# Patient Record
Sex: FEMALE | Race: WHITE | Employment: FULL TIME | URBAN - METROPOLITAN AREA
[De-identification: names, ages, dates, MRNs, and addresses within clinical notes are randomized per-mention and may not be internally consistent; named-entity substitution may affect disease eponyms.]

---

## 2017-03-02 ENCOUNTER — TRANSFERRED RECORDS (OUTPATIENT)
Dept: HEALTH INFORMATION MANAGEMENT | Facility: CLINIC | Age: 57
End: 2017-03-02

## 2017-03-06 DIAGNOSIS — I10 UNCONTROLLED HYPERTENSION: Primary | ICD-10-CM

## 2018-03-19 ENCOUNTER — TRANSFERRED RECORDS (OUTPATIENT)
Dept: HEALTH INFORMATION MANAGEMENT | Facility: CLINIC | Age: 58
End: 2018-03-19

## 2018-03-19 DIAGNOSIS — I10 BENIGN ESSENTIAL HYPERTENSION: Primary | ICD-10-CM

## 2018-04-05 ENCOUNTER — TRANSFERRED RECORDS (OUTPATIENT)
Dept: HEALTH INFORMATION MANAGEMENT | Facility: CLINIC | Age: 58
End: 2018-04-05

## 2018-05-08 ENCOUNTER — OFFICE VISIT (OUTPATIENT)
Dept: CARDIOLOGY | Facility: CLINIC | Age: 58
End: 2018-05-08
Payer: COMMERCIAL

## 2018-05-08 VITALS
HEIGHT: 64 IN | OXYGEN SATURATION: 98 % | SYSTOLIC BLOOD PRESSURE: 224 MMHG | HEART RATE: 50 BPM | WEIGHT: 194 LBS | BODY MASS INDEX: 33.12 KG/M2 | DIASTOLIC BLOOD PRESSURE: 102 MMHG

## 2018-05-08 DIAGNOSIS — I10 BENIGN ESSENTIAL HYPERTENSION: Primary | ICD-10-CM

## 2018-05-08 PROCEDURE — 99204 OFFICE O/P NEW MOD 45 MIN: CPT | Performed by: INTERNAL MEDICINE

## 2018-05-08 RX ORDER — CARVEDILOL 25 MG/1
25 TABLET ORAL 2 TIMES DAILY WITH MEALS
Qty: 60 TABLET | Refills: 11 | Status: SHIPPED | OUTPATIENT
Start: 2018-05-08 | End: 2018-10-15

## 2018-05-08 RX ORDER — BUPROPION HYDROCHLORIDE 150 MG/1
150 TABLET, FILM COATED, EXTENDED RELEASE ORAL 2 TIMES DAILY
COMMUNITY
End: 2018-10-15

## 2018-05-08 RX ORDER — AMLODIPINE BESYLATE 5 MG/1
5 TABLET ORAL DAILY
Qty: 30 TABLET | Refills: 11 | Status: SHIPPED | OUTPATIENT
Start: 2018-05-08

## 2018-05-08 NOTE — LETTER
5/8/2018    Clarisa Cancino NP  [facility] [address]  [facility] [address]    RE: Lorena Gutierrez       Dear Colleague,    I had the pleasure of seeing Lorena Gutierrez in the [facility].    HISTORY:    Lorena Gutierrez is a pleasant 58-year-old female who was asked to see me for assistance in management of hypertension.  She has a history of diabetes, hyperlipidemia, and a history of smoking.    Lorena has never had any cardiac issues.  She denies exertional chest, arm, neck, or jaw discomfort as well as any sense of palpitations, PND/orthopnea, syncope/near syncope, strokelike symptoms, orthostasis, peripheral edema, or claudication.  She admits to being anxious when she goes into see physicians but she does not complain of jitteriness and shakiness or flushing.    Cardiac risk factors include a history of severe and poorly controlled hypertension, hyperlipidemia for which she is on Lipitor 40 mg per day, type 2 diabetes dating back at least 10 years, and ongoing cigarette use at a rate of 0.5-1 pack per day.  She also has a strong family history of coronary artery disease with her mother dying of myocardial infarct at age 56, father dying at age 70 of myocardial infarct.  There is also a very strong family history of hypertension in both her parents and in all of her siblings that she still keeps in touch with.  Patient herself states that she has treated for hypertension for at least 15 years.  It has recently been higher than usual with home blood pressures reading about 180/110.    The patient is aware of nonpharmacologic mechanisms for blood pressure control.  She is an ex-drinker but no longer uses alcohol.  She tries to minimize her salt intake not only by avoiding added salt but also watching salt content of her foods.  It sounds like she avoids typical high salt symptoms.  She also states that she walks daily about 4 miles.  She does not find her self short of  breath with this activity nor does she develop chest pain.    Lorena reports that she usually sleeps okay although she admits that she is often tired in the daytime.  She feels refreshed when she first gets up.  Her  has never noticed that she stops breathing at night and he has never complained about her being a heavy snorer.    The chart is reviewed and it looks like there is no evidence of secondary causes of hypertension.  She had renal ultrasounds done showing no evidence of renal stenosis back in October 2016.  Laboratory studies including TSH, comprehensive metabolic profile, CBC, urinalysis, and magnesium are all within normal limits.      ASSESSMENT/PLAN:    1.  Severe essential hypertension, uncontrolled.  No secondary causes apparent.  I will have her do overnight oximetry to make sure were not missing sleep apnea although she does not give symptoms suggesting this.  She seems to be following generally healthy lifestyle with reduced salt intake, regular exercise, and avoidance of alcohol.  She denies over-the-counter or recreational drug use.  Blood pressure management will simply be continued trial and error.  Today I have switched her off of metoprolol and on to carvedilol at the equivalent dose (25 mg twice daily) since it tends to be a little bit better at blood pressure control.  I have also added Norvasc at a dose of 5 mg per day.  I have asked her to check her blood pressure and keep a list for me, varying the time of day that it is measured.  I will plan on seeing her in another month and we will review that list.  She will bring her blood pressure machine and to make sure it is calibrated correctly.  Our next pharmacologic change will be to add a diuretic if blood pressure is not well-controlled.    Thank you for asking me to participate in your patient's care.  Please do not hesitate to call if I can be of further assistance.    Orders Placed This Encounter   Procedures     Follow-Up with  Cardiologist     Overnight oximetry study     Exercise Stress Echocardiogram     Orders Placed This Encounter   Medications     buPROPion (ZYBAN) 150 MG 12 hr tablet     Sig: Take 150 mg by mouth 2 times daily     CLONIDINE HCL PO     Sig: Take 0.3 mg by mouth 2 times daily     dapagliflozin (FARXIGA) tablet     Sig: Take 5 mg by mouth daily     carvedilol (COREG) 25 MG tablet     Sig: Take 1 tablet (25 mg) by mouth 2 times daily (with meals)     Dispense:  60 tablet     Refill:  11     amLODIPine (NORVASC) 5 MG tablet     Sig: Take 1 tablet (5 mg) by mouth daily     Dispense:  30 tablet     Refill:  11     Medications Discontinued During This Encounter   Medication Reason     budesonide-formoterol (SYMBICORT) 160-4.5 MCG/ACT inhaler Therapy completed     hydrochlorothiazide (HYDRODIURIL) 25 MG tablet Therapy completed     metFORMIN (GLUCOPHAGE-XR) 500 MG 24 hr tablet Therapy completed     METOPROLOL TARTRATE PO Medication Reconciliation Clean Up     metoprolol (LOPRESSOR) 100 MG tablet Alternate therapy         Encounter Diagnosis   Name Primary?     Benign essential hypertension Yes       CURRENT MEDICATIONS:  Current Outpatient Prescriptions   Medication Sig Dispense Refill     albuterol (VENTOLIN HFA) 108 (90 BASE) MCG/ACT inhaler Inhale 2 puffs into the lungs every 6 hours.       ALLOPURINOL PO Take 300 mg by mouth daily       amLODIPine (NORVASC) 5 MG tablet Take 1 tablet (5 mg) by mouth daily 30 tablet 11     ASPIRIN ADULT LOW STRENGTH PO Take 81 mg by mouth daily       ATORVASTATIN CALCIUM PO Take 40 mg by mouth       carvedilol (COREG) 25 MG tablet Take 1 tablet (25 mg) by mouth 2 times daily (with meals) 60 tablet 11     CLONIDINE HCL PO Take 0.3 mg by mouth 2 times daily       dapagliflozin (FARXIGA) tablet Take 5 mg by mouth daily       fenofibric acid (TRILIPIX) 135 MG capsule Take 135 mg by mouth daily       lisinopril (PRINIVIL,ZESTRIL) 20 MG tablet Take 40 mg by mouth daily        Blood Glucose  "Monitoring Suppl ([address] CONTOUR MONITOR) W/DEVICE KIT        buPROPion (ZYBAN) 150 MG 12 hr tablet Take 150 mg by mouth 2 times daily       glucose blood VI test strips (LYNDSEY CONTOUR TEST) strip by In Vitro route daily.       Lancets (MICROLET) MISC        order for DME Equipment being ordered: Blood pressure monitor/cuff 1 Device 0       ALLERGIES   No Known Allergies    PAST MEDICAL HISTORY:  Past Medical History:   Diagnosis Date     COPD (chronic obstructive pulmonary disease) (H)      Diabetes mellitus (H)      Hypertension        PAST SURGICAL HISTORY:  No past surgical history on file.    FAMILY HISTORY:  No family history on file.    SOCIAL HISTORY:  Social History     Social History     Marital status:      Spouse name: N/A     Number of children: N/A     Years of education: N/A     Social History Main Topics     Smoking status: Current Every Day Smoker     Packs/day: 1.00     Types: Cigarettes     Smokeless tobacco: Not on file     Alcohol use No     Drug use: No     Sexual activity: Not on file     Other Topics Concern     Not on file     Social History Narrative       Review of Systems:  Skin:  Negative     Eyes:  Negative    ENT:  Negative    Respiratory:  Negative    Cardiovascular:  Negative for;palpitations;chest pain;edema lightheadedness;dizziness;Positive for  Gastroenterology: Negative    Genitourinary:  Negative    Musculoskeletal:  Negative    Neurologic:  Negative    Psychiatric:  Negative    Heme/Lymph/Imm:  Negative    Endocrine:  Negative      Physical Exam:  Vitals: BP (!) 224/102  Pulse 50  Ht 1.626 m (5' 4\")  Wt 88 kg (194 lb)  SpO2 98%  BMI 33.3 kg/m2    Constitutional:  cooperative appears older than stated age      Skin:  warm and dry to the touch        Head:  normocephalic        Eyes:  no xanthalasma        ENT:  no pallor or cyanosis        Neck:  carotid pulses are full and equal bilaterally, JVP normal, no carotid bruit        Chest:      Diffuse rhonchi, no " wheezes, breath sounds mildly diminished throughout.    Cardiac: regular rhythm, normal S1/S2, no S3 or S4, apical impulse not displaced, no murmurs, gallops or rubs                  Abdomen:  abdomen soft;BS normoactive        Vascular: pulses full and equal                                      Extremities and Back:  no edema        Neurological:  no gross motor deficits          Recent Lab Results:  LIPID RESULTS:  No results found for: CHOL, HDL, LDL, TRIG, CHOLHDLRATIO    LIVER ENZYME RESULTS:  Lab Results   Component Value Date    AST 20 09/30/2016    ALT 32 09/30/2016       CBC RESULTS:  Lab Results   Component Value Date    WBC 10.1 09/30/2016    RBC 5.54 (H) 09/30/2016    HGB 16.1 (H) 09/30/2016    HCT 47.7 (H) 09/30/2016    MCV 86 09/30/2016    MCH 29.1 09/30/2016    MCHC 33.8 09/30/2016    RDW 14.4 09/30/2016     09/30/2016       BMP RESULTS:  Lab Results   Component Value Date     09/30/2016    POTASSIUM 3.7 09/30/2016    CHLORIDE 105 09/30/2016    CO2 31 09/30/2016    ANIONGAP 6 09/30/2016     (H) 09/30/2016    BUN 16 09/30/2016    CR 0.80 09/30/2016    GFRESTIMATED 74 09/30/2016    GFRESTBLACK 90 09/30/2016    CLAUDIO 9.5 09/30/2016        A1C RESULTS:  No results found for: A1C    INR RESULTS:  No results found for: INR      Thank you for allowing me to participate in the care of your patient.    Sincerely,     Phuc Quezada MD     [facility]

## 2018-05-08 NOTE — MR AVS SNAPSHOT
"              After Visit Summary   5/8/2018    Lorena Gutierrez    MRN: [id number]           Patient Information     Date Of Birth          1960        Visit Information        Provider Department      5/8/2018 2:00 PM Phuc Quezada MD [facility]        Today's Diagnoses     Benign essential hypertension    -  1       Follow-ups after your visit        Additional Services     Follow-Up with Cardiologist                 Future tests that were ordered for you today     Open Future Orders        Priority Expected Expires Ordered    Follow-Up with Cardiologist Routine 6/7/2018 5/8/2019 5/8/2018    Exercise Stress Echocardiogram Routine 5/15/2018 5/8/2019 5/8/2018    Overnight oximetry study Routine 5/15/2018 5/8/2019 5/8/2018            Who to contact     If you have questions or need follow up information about today's clinic visit or your schedule please contact [facility] directly at [phone].  Normal or non-critical lab and imaging results will be communicated to you by [employer], letter or phone within 4 business days after the clinic has received the results. If you do not hear from us within 7 days, please contact the clinic through [employer] or phone. If you have a critical or abnormal lab result, we will notify you by phone as soon as possible.  Submit refill requests through [employer] or call your pharmacy and they will forward the refill request to us. Please allow 3 business days for your refill to be completed.          Additional Information About Your Visit        Wallarmhart Information     [employer] lets you send messages to your doctor, view your test results, renew your prescriptions, schedule appointments and more. To sign up, go to [email or web address] . Click on \"Log in\" on the left side of the screen, which will take you to the Welcome page. Then click on \"Sign up Now\" on the right side of the page. " "    You will be asked to enter the access code listed below, as well as some personal information. Please follow the directions to create your username and password.     Your access code is: [email or web address]  Expires: 2018  2:13 PM     Your access code will  in 90 days. If you need help or a new code, please call your [facility] clinic or [phone].        Care EveryWhere ID     This is your Care EveryWhere ID. This could be used by other organizations to access your [facility] medical records  [id number]        Your Vitals Were     Pulse Height Pulse Oximetry BMI (Body Mass Index)          50 1.626 m (5' 4\") 98% 33.3 kg/m2         Blood Pressure from Last 3 Encounters:   18 (!) 224/102   16 179/82   07/15/13 (!) 163/93    Weight from Last 3 Encounters:   18 88 kg (194 lb)   07/15/13 99.8 kg (220 lb)                 Today's Medication Changes          These changes are accurate as of 18  2:38 PM.  If you have any questions, ask your nurse or doctor.               Start taking these medicines.        Dose/Directions    amLODIPine 5 MG tablet   Commonly known as:  NORVASC   Used for:  Benign essential hypertension        Dose:  5 mg   Take 1 tablet (5 mg) by mouth daily   Quantity:  30 tablet   Refills:  11       carvedilol 25 MG tablet   Commonly known as:  COREG   Used for:  Benign essential hypertension        Dose:  25 mg   Take 1 tablet (25 mg) by mouth 2 times daily (with meals)   Quantity:  60 tablet   Refills:  11         Stop taking these medicines if you haven't already. Please contact your care team if you have questions.     metoprolol tartrate 100 MG tablet   Commonly known as:  LOPRESSOR                Where to get your medicines      These medications were sent to [facility] - [address]   [address] [address]     Phone:  [phone]     amLODIPine 5 MG tablet    carvedilol 25 MG tablet                Primary Care Provider Office Phone # Fax # "    Clarisa Cancino, KIRILL [phone]       [place] [address]   [facility] [address]        Equal Access to Services     TOMMYJAIME SHERRI : Marysol haresh sierra paramjito Soilianaali, waaxda luqadaha, qaybta kaalmada adegilada, hermes barroso maralranjit tyler reyna garcia. So [facility] [phone].    ATENCIÓN: Si habla español, tiene a avelar disposición servicios gratuitos de asistencia lingüística. Isaiah al [phone].    We comply with applicable federal civil rights laws and [place] laws. We do not discriminate on the basis of race, color, national origin, age, disability, sex, sexual orientation, or gender identity.            Thank you!     Thank you for choosing [facility]  for your care. Our goal is always to provide you with excellent care. Hearing back from our patients is one way we can continue to improve our services. Please take a few minutes to complete the written survey that you may receive in the mail after your visit with us. Thank you!             Your Updated Medication List - Protect others around you: Learn how to safely use, store and throw away your medicines at www.disposemymeds.org.          This list is accurate as of 5/8/18  2:38 PM.  Always use your most recent med list.                   Brand Name Dispense Instructions for use Diagnosis    ALLOPURINOL PO      Take 300 mg by mouth daily        amLODIPine 5 MG tablet    NORVASC    30 tablet    Take 1 tablet (5 mg) by mouth daily    Benign essential hypertension       ASPIRIN ADULT LOW STRENGTH PO      Take 81 mg by mouth daily        ATORVASTATIN CALCIUM PO      Take 40 mg by mouth        LYNDSEY CONTOUR test strip   Generic drug:  blood glucose monitoring      by In Vitro route daily.        blood glucose monitoring lancets           blood glucose monitoring meter device kit           buPROPion 150 MG 12 hr tablet    ZYBAN     Take 150 mg by mouth 2 times daily        carvedilol 25 MG  tablet    COREG    60 tablet    Take 1 tablet (25 mg) by mouth 2 times daily (with meals)    Benign essential hypertension       CLONIDINE HCL PO      Take 0.3 mg by mouth 2 times daily        dapagliflozin tablet    FARXIGA     Take 5 mg by mouth daily        lisinopril 20 MG tablet    PRINIVIL/ZESTRIL     Take 40 mg by mouth daily        order for DME     1 Device    Equipment being ordered: Blood pressure monitor/cuff    Hypertensive urgency       TRILIPIX 135 MG capsule   Generic drug:  fenofibric acid      Take 135 mg by mouth daily        VENTOLIN  (90 Base) MCG/ACT Inhaler   Generic drug:  albuterol      Inhale 2 puffs into the lungs every 6 hours.

## 2018-05-08 NOTE — PROGRESS NOTES
HISTORY:    Lorena Gutierrez is a pleasant 58-year-old female who was asked to see me for assistance in management of hypertension.  She has a history of diabetes, hyperlipidemia, and a history of smoking.    Lorena has never had any cardiac issues.  She denies exertional chest, arm, neck, or jaw discomfort as well as any sense of palpitations, PND/orthopnea, syncope/near syncope, strokelike symptoms, orthostasis, peripheral edema, or claudication.  She admits to being anxious when she goes into see physicians but she does not complain of jitteriness and shakiness or flushing.    Cardiac risk factors include a history of severe and poorly controlled hypertension, hyperlipidemia for which she is on Lipitor 40 mg per day, type 2 diabetes dating back at least 10 years, and ongoing cigarette use at a rate of 0.5-1 pack per day.  She also has a strong family history of coronary artery disease with her mother dying of myocardial infarct at age 56, father dying at age 70 of myocardial infarct.  There is also a very strong family history of hypertension in both her parents and in all of her siblings that she still keeps in touch with.  Patient herself states that she has treated for hypertension for at least 15 years.  It has recently been higher than usual with home blood pressures reading about 180/110.    The patient is aware of nonpharmacologic mechanisms for blood pressure control.  She is an ex-drinker but no longer uses alcohol.  She tries to minimize her salt intake not only by avoiding added salt but also watching salt content of her foods.  It sounds like she avoids typical high salt symptoms.  She also states that she walks daily about 4 miles.  She does not find her self short of breath with this activity nor does she develop chest pain.    Lorena reports that she usually sleeps okay although she admits that she is often tired in the daytime.  She feels refreshed when she first gets up.  Her  has never  noticed that she stops breathing at night and he has never complained about her being a heavy snorer.    The chart is reviewed and it looks like there is no evidence of secondary causes of hypertension.  She had renal ultrasounds done showing no evidence of renal stenosis back in October 2016.  Laboratory studies including TSH, comprehensive metabolic profile, CBC, urinalysis, and magnesium are all within normal limits.      ASSESSMENT/PLAN:    1.  Severe essential hypertension, uncontrolled.  No secondary causes apparent.  I will have her do overnight oximetry to make sure were not missing sleep apnea although she does not give symptoms suggesting this.  She seems to be following generally healthy lifestyle with reduced salt intake, regular exercise, and avoidance of alcohol.  She denies over-the-counter or recreational drug use.  Blood pressure management will simply be continued trial and error.  Today I have switched her off of metoprolol and on to carvedilol at the equivalent dose (25 mg twice daily) since it tends to be a little bit better at blood pressure control.  I have also added Norvasc at a dose of 5 mg per day.  I have asked her to check her blood pressure and keep a list for me, varying the time of day that it is measured.  I will plan on seeing her in another month and we will review that list.  She will bring her blood pressure machine and to make sure it is calibrated correctly.  Our next pharmacologic change will be to add a diuretic if blood pressure is not well-controlled.    Thank you for asking me to participate in your patient's care.  Please do not hesitate to call if I can be of further assistance.    Orders Placed This Encounter   Procedures     Follow-Up with Cardiologist     Overnight oximetry study     Exercise Stress Echocardiogram     Orders Placed This Encounter   Medications     buPROPion (ZYBAN) 150 MG 12 hr tablet     Sig: Take 150 mg by mouth 2 times daily     CLONIDINE HCL PO      Sig: Take 0.3 mg by mouth 2 times daily     dapagliflozin (FARXIGA) tablet     Sig: Take 5 mg by mouth daily     carvedilol (COREG) 25 MG tablet     Sig: Take 1 tablet (25 mg) by mouth 2 times daily (with meals)     Dispense:  60 tablet     Refill:  11     amLODIPine (NORVASC) 5 MG tablet     Sig: Take 1 tablet (5 mg) by mouth daily     Dispense:  30 tablet     Refill:  11     Medications Discontinued During This Encounter   Medication Reason     budesonide-formoterol (SYMBICORT) 160-4.5 MCG/ACT inhaler Therapy completed     hydrochlorothiazide (HYDRODIURIL) 25 MG tablet Therapy completed     metFORMIN (GLUCOPHAGE-XR) 500 MG 24 hr tablet Therapy completed     METOPROLOL TARTRATE PO Medication Reconciliation Clean Up     metoprolol (LOPRESSOR) 100 MG tablet Alternate therapy         Encounter Diagnosis   Name Primary?     Benign essential hypertension Yes       CURRENT MEDICATIONS:  Current Outpatient Prescriptions   Medication Sig Dispense Refill     albuterol (VENTOLIN HFA) 108 (90 BASE) MCG/ACT inhaler Inhale 2 puffs into the lungs every 6 hours.       ALLOPURINOL PO Take 300 mg by mouth daily       amLODIPine (NORVASC) 5 MG tablet Take 1 tablet (5 mg) by mouth daily 30 tablet 11     ASPIRIN ADULT LOW STRENGTH PO Take 81 mg by mouth daily       ATORVASTATIN CALCIUM PO Take 40 mg by mouth       carvedilol (COREG) 25 MG tablet Take 1 tablet (25 mg) by mouth 2 times daily (with meals) 60 tablet 11     CLONIDINE HCL PO Take 0.3 mg by mouth 2 times daily       dapagliflozin (FARXIGA) tablet Take 5 mg by mouth daily       fenofibric acid (TRILIPIX) 135 MG capsule Take 135 mg by mouth daily       lisinopril (PRINIVIL,ZESTRIL) 20 MG tablet Take 40 mg by mouth daily        Blood Glucose Monitoring Suppl ([employer] CONTOUR MONITOR) W/DEVICE KIT        buPROPion (ZYBAN) 150 MG 12 hr tablet Take 150 mg by mouth 2 times daily       glucose blood VI test strips (LYNDSEY CONTOUR TEST) strip by In Vitro route daily.       Lancets  "(MICROLET) MISC        order for DME Equipment being ordered: Blood pressure monitor/cuff 1 Device 0       ALLERGIES   No Known Allergies    PAST MEDICAL HISTORY:  Past Medical History:   Diagnosis Date     COPD (chronic obstructive pulmonary disease) (H)      Diabetes mellitus (H)      Hypertension        PAST SURGICAL HISTORY:  No past surgical history on file.    FAMILY HISTORY:  No family history on file.    SOCIAL HISTORY:  Social History     Social History     Marital status:      Spouse name: N/A     Number of children: N/A     Years of education: N/A     Social History Main Topics     Smoking status: Current Every Day Smoker     Packs/day: 1.00     Types: Cigarettes     Smokeless tobacco: Not on file     Alcohol use No     Drug use: No     Sexual activity: Not on file     Other Topics Concern     Not on file     Social History Narrative       Review of Systems:  Skin:  Negative     Eyes:  Negative    ENT:  Negative    Respiratory:  Negative    Cardiovascular:  Negative for;palpitations;chest pain;edema lightheadedness;dizziness;Positive for  Gastroenterology: Negative    Genitourinary:  Negative    Musculoskeletal:  Negative    Neurologic:  Negative    Psychiatric:  Negative    Heme/Lymph/Imm:  Negative    Endocrine:  Negative      Physical Exam:  Vitals: BP (!) 224/102  Pulse 50  Ht 1.626 m (5' 4\")  Wt 88 kg (194 lb)  SpO2 98%  BMI 33.3 kg/m2    Constitutional:  cooperative appears older than stated age      Skin:  warm and dry to the touch        Head:  normocephalic        Eyes:  no xanthalasma        ENT:  no pallor or cyanosis        Neck:  carotid pulses are full and equal bilaterally, JVP normal, no carotid bruit        Chest:      Diffuse rhonchi, no wheezes, breath sounds mildly diminished throughout.    Cardiac: regular rhythm, normal S1/S2, no S3 or S4, apical impulse not displaced, no murmurs, gallops or rubs                  Abdomen:  abdomen soft;BS normoactive        Vascular: " pulses full and equal                                      Extremities and Back:  no edema        Neurological:  no gross motor deficits          Recent Lab Results:  LIPID RESULTS:  No results found for: CHOL, HDL, LDL, TRIG, CHOLHDLRATIO    LIVER ENZYME RESULTS:  Lab Results   Component Value Date    AST 20 09/30/2016    ALT 32 09/30/2016       CBC RESULTS:  Lab Results   Component Value Date    WBC 10.1 09/30/2016    RBC 5.54 (H) 09/30/2016    HGB 16.1 (H) 09/30/2016    HCT 47.7 (H) 09/30/2016    MCV 86 09/30/2016    MCH 29.1 09/30/2016    MCHC 33.8 09/30/2016    RDW 14.4 09/30/2016     09/30/2016       BMP RESULTS:  Lab Results   Component Value Date     09/30/2016    POTASSIUM 3.7 09/30/2016    CHLORIDE 105 09/30/2016    CO2 31 09/30/2016    ANIONGAP 6 09/30/2016     (H) 09/30/2016    BUN 16 09/30/2016    CR 0.80 09/30/2016    GFRESTIMATED 74 09/30/2016    GFRESTBLACK 90 09/30/2016    CLAUDIO 9.5 09/30/2016        A1C RESULTS:  No results found for: A1C    INR RESULTS:  No results found for: INR      Phuc Quezada MD, [facility]    CC  No referring provider defined for this encounter.

## 2018-05-08 NOTE — LETTER
5/8/2018    Clarisa Cancino NP  [facility] [address]  [facility] [address]    RE: Lorena Gutierrez       Dear Colleague,    I had the pleasure of seeing Lorena Gutierrez in the [facility].    HISTORY:    Lorena Gutierrez is a pleasant 58-year-old female who was asked to see me for assistance in management of hypertension.  She has a history of diabetes, hyperlipidemia, and a history of smoking.    Lorena has never had any cardiac issues.  She denies exertional chest, arm, neck, or jaw discomfort as well as any sense of palpitations, PND/orthopnea, syncope/near syncope, strokelike symptoms, orthostasis, peripheral edema, or claudication.  She admits to being anxious when she goes into see physicians but she does not complain of jitteriness and shakiness or flushing.    Cardiac risk factors include a history of severe and poorly controlled hypertension, hyperlipidemia for which she is on Lipitor 40 mg per day, type 2 diabetes dating back at least 10 years, and ongoing cigarette use at a rate of 0.5-1 pack per day.  She also has a strong family history of coronary artery disease with her mother dying of myocardial infarct at age 56, father dying at age 70 of myocardial infarct.  There is also a very strong family history of hypertension in both her parents and in all of her siblings that she still keeps in touch with.  Patient herself states that she has treated for hypertension for at least 15 years.  It has recently been higher than usual with home blood pressures reading about 180/110.    The patient is aware of nonpharmacologic mechanisms for blood pressure control.  She is an ex-drinker but no longer uses alcohol.  She tries to minimize her salt intake not only by avoiding added salt but also watching salt content of her foods.  It sounds like she avoids typical high salt symptoms.  She also states that she walks daily about 4 miles.  She does not find her self short of  breath with this activity nor does she develop chest pain.    Lorena reports that she usually sleeps okay although she admits that she is often tired in the daytime.  She feels refreshed when she first gets up.  Her  has never noticed that she stops breathing at night and he has never complained about her being a heavy snorer.    The chart is reviewed and it looks like there is no evidence of secondary causes of hypertension.  She had renal ultrasounds done showing no evidence of renal stenosis back in October 2016.  Laboratory studies including TSH, comprehensive metabolic profile, CBC, urinalysis, and magnesium are all within normal limits.      ASSESSMENT/PLAN:    1.  Severe essential hypertension, uncontrolled.  No secondary causes apparent.  I will have her do overnight oximetry to make sure were not missing sleep apnea although she does not give symptoms suggesting this.  She seems to be following generally healthy lifestyle with reduced salt intake, regular exercise, and avoidance of alcohol.  She denies over-the-counter or recreational drug use.  Blood pressure management will simply be continued trial and error.  Today I have switched her off of metoprolol and on to carvedilol at the equivalent dose (25 mg twice daily) since it tends to be a little bit better at blood pressure control.  I have also added Norvasc at a dose of 5 mg per day.  I have asked her to check her blood pressure and keep a list for me, varying the time of day that it is measured.  I will plan on seeing her in another month and we will review that list.  She will bring her blood pressure machine and to make sure it is calibrated correctly.  Our next pharmacologic change will be to add a diuretic if blood pressure is not well-controlled.    Thank you for asking me to participate in your patient's care.  Please do not hesitate to call if I can be of further assistance.    Orders Placed This Encounter   Procedures     Follow-Up with  Cardiologist     Overnight oximetry study     Exercise Stress Echocardiogram     Orders Placed This Encounter   Medications     buPROPion (ZYBAN) 150 MG 12 hr tablet     Sig: Take 150 mg by mouth 2 times daily     CLONIDINE HCL PO     Sig: Take 0.3 mg by mouth 2 times daily     dapagliflozin (FARXIGA) tablet     Sig: Take 5 mg by mouth daily     carvedilol (COREG) 25 MG tablet     Sig: Take 1 tablet (25 mg) by mouth 2 times daily (with meals)     Dispense:  60 tablet     Refill:  11     amLODIPine (NORVASC) 5 MG tablet     Sig: Take 1 tablet (5 mg) by mouth daily     Dispense:  30 tablet     Refill:  11     Medications Discontinued During This Encounter   Medication Reason     budesonide-formoterol (SYMBICORT) 160-4.5 MCG/ACT inhaler Therapy completed     hydrochlorothiazide (HYDRODIURIL) 25 MG tablet Therapy completed     metFORMIN (GLUCOPHAGE-XR) 500 MG 24 hr tablet Therapy completed     METOPROLOL TARTRATE PO Medication Reconciliation Clean Up     metoprolol (LOPRESSOR) 100 MG tablet Alternate therapy         Encounter Diagnosis   Name Primary?     Benign essential hypertension Yes       CURRENT MEDICATIONS:  Current Outpatient Prescriptions   Medication Sig Dispense Refill     albuterol (VENTOLIN HFA) 108 (90 BASE) MCG/ACT inhaler Inhale 2 puffs into the lungs every 6 hours.       ALLOPURINOL PO Take 300 mg by mouth daily       amLODIPine (NORVASC) 5 MG tablet Take 1 tablet (5 mg) by mouth daily 30 tablet 11     ASPIRIN ADULT LOW STRENGTH PO Take 81 mg by mouth daily       ATORVASTATIN CALCIUM PO Take 40 mg by mouth       carvedilol (COREG) 25 MG tablet Take 1 tablet (25 mg) by mouth 2 times daily (with meals) 60 tablet 11     CLONIDINE HCL PO Take 0.3 mg by mouth 2 times daily       dapagliflozin (FARXIGA) tablet Take 5 mg by mouth daily       fenofibric acid (TRILIPIX) 135 MG capsule Take 135 mg by mouth daily       lisinopril (PRINIVIL,ZESTRIL) 20 MG tablet Take 40 mg by mouth daily        Blood Glucose  "Monitoring Suppl ([employer] CONTOUR MONITOR) W/DEVICE KIT        buPROPion (ZYBAN) 150 MG 12 hr tablet Take 150 mg by mouth 2 times daily       glucose blood VI test strips (LYNDSEY CONTOUR TEST) strip by In Vitro route daily.       Lancets (MICROLET) MISC        order for DME Equipment being ordered: Blood pressure monitor/cuff 1 Device 0       ALLERGIES   No Known Allergies    PAST MEDICAL HISTORY:  Past Medical History:   Diagnosis Date     COPD (chronic obstructive pulmonary disease) (H)      Diabetes mellitus (H)      Hypertension        PAST SURGICAL HISTORY:  No past surgical history on file.    FAMILY HISTORY:  No family history on file.    SOCIAL HISTORY:  Social History     Social History     Marital status:      Spouse name: N/A     Number of children: N/A     Years of education: N/A     Social History Main Topics     Smoking status: Current Every Day Smoker     Packs/day: 1.00     Types: Cigarettes     Smokeless tobacco: Not on file     Alcohol use No     Drug use: No     Sexual activity: Not on file     Other Topics Concern     Not on file     Social History Narrative       Review of Systems:  Skin:  Negative     Eyes:  Negative    ENT:  Negative    Respiratory:  Negative    Cardiovascular:  Negative for;palpitations;chest pain;edema lightheadedness;dizziness;Positive for  Gastroenterology: Negative    Genitourinary:  Negative    Musculoskeletal:  Negative    Neurologic:  Negative    Psychiatric:  Negative    Heme/Lymph/Imm:  Negative    Endocrine:  Negative      Physical Exam:  Vitals: BP (!) 224/102  Pulse 50  Ht 1.626 m (5' 4\")  Wt 88 kg (194 lb)  SpO2 98%  BMI 33.3 kg/m2    Constitutional:  cooperative appears older than stated age      Skin:  warm and dry to the touch        Head:  normocephalic        Eyes:  no xanthalasma        ENT:  no pallor or cyanosis        Neck:  carotid pulses are full and equal bilaterally, JVP normal, no carotid bruit        Chest:      Diffuse rhonchi, no " wheezes, breath sounds mildly diminished throughout.    Cardiac: regular rhythm, normal S1/S2, no S3 or S4, apical impulse not displaced, no murmurs, gallops or rubs                  Abdomen:  abdomen soft;BS normoactive        Vascular: pulses full and equal                                      Extremities and Back:  no edema        Neurological:  no gross motor deficits          Recent Lab Results:  LIPID RESULTS:  No results found for: CHOL, HDL, LDL, TRIG, CHOLHDLRATIO    LIVER ENZYME RESULTS:  Lab Results   Component Value Date    AST 20 09/30/2016    ALT 32 09/30/2016       CBC RESULTS:  Lab Results   Component Value Date    WBC 10.1 09/30/2016    RBC 5.54 (H) 09/30/2016    HGB 16.1 (H) 09/30/2016    HCT 47.7 (H) 09/30/2016    MCV 86 09/30/2016    MCH 29.1 09/30/2016    MCHC 33.8 09/30/2016    RDW 14.4 09/30/2016     09/30/2016       BMP RESULTS:  Lab Results   Component Value Date     09/30/2016    POTASSIUM 3.7 09/30/2016    CHLORIDE 105 09/30/2016    CO2 31 09/30/2016    ANIONGAP 6 09/30/2016     (H) 09/30/2016    BUN 16 09/30/2016    CR 0.80 09/30/2016    GFRESTIMATED 74 09/30/2016    GFRESTBLACK 90 09/30/2016    CLAUDIO 9.5 09/30/2016        A1C RESULTS:  No results found for: A1C    INR RESULTS:  No results found for: INR      Phuc Quezada MD, [facility]    CC  No referring provider defined for this encounter.                    Thank you for allowing me to participate in the care of your patient.      Sincerely,     Phuc Quezada MD     [facility]    cc:   No referring provider defined for this encounter.

## 2018-05-21 ENCOUNTER — OFFICE VISIT (OUTPATIENT)
Dept: SLEEP MEDICINE | Facility: CLINIC | Age: 58
End: 2018-05-21
Payer: COMMERCIAL

## 2018-05-21 ENCOUNTER — TRANSFERRED RECORDS (OUTPATIENT)
Dept: HEALTH INFORMATION MANAGEMENT | Facility: CLINIC | Age: 58
End: 2018-05-21

## 2018-05-21 DIAGNOSIS — R09.02 HYPOXEMIA: Primary | ICD-10-CM

## 2018-05-21 DIAGNOSIS — I10 BENIGN ESSENTIAL HYPERTENSION: ICD-10-CM

## 2018-05-21 NOTE — MR AVS SNAPSHOT
After Visit Summary   5/21/2018    Lorena Gutierrez    MRN: [id number]           Patient Information     Date Of Birth          1960        Visit Information        Provider Department      5/21/2018 2:30 PM PH BED 3 [facility]        Today's Diagnoses     Benign essential hypertension           Follow-ups after your visit        Your next 10 appointments already scheduled     May 22, 2018  2:30 PM CDT   Oximetry Drop Off with PH BED 3   [facility] ([facility])    [address]  [place] [address] [phone]            May 23, 2018 10:00 AM CDT   Ech Stress Test with PHECHR2   [facility] ([facility])    [address]  [place] [id number] [phone]           1. Please bring or wear a comfortable two-piece outfit and walking shoes. 2. Stop eating 3 hours before the test. You may drink water or juice. 3. Stop all caffeine 12 hours before the test. This includes coffee, tea, soda pop, chocolate and certain medicines (such as Anacin and Excederin). Also avoid decaf coffee and tea, as these contain small amounts of caffeine. 4. No alcohol, smoking or use of other tobacco products for 12 hours before the test. 5. Refer to your provider instructions to see if you need to stop any medications (such as beta-blockers or nitrates) for this test. 6. For patients with diabetes: - If you take insulin, call your diabetes care team. Ask if you should take a   dose the morning of your test. - If you take diabetes medicine by mouth, dont take it on the morning of your test. Bring it with you to take after the test. (If you have questions, call your diabetes care team) 7. When you arrive, please tell us if: - You have diabetes. - You have taken Viagra, Cialis or Levitra in the past 48 hours. 8. For any questions that cannot be answered, please contact the ordering physician          "   2018  1:00 PM CDT   Return Visit with Phuc Quezada MD   [facility] ([facility])    [address] [address] [phone]              Who to contact     If you have questions or need follow up information about today's clinic visit or your schedule please contact [facility] directly at [phone].  Normal or non-critical lab and imaging results will be communicated to you by [employer], letter or phone within 4 business days after the clinic has received the results. If you do not hear from us within 7 days, please contact the clinic through [employer] or phone. If you have a critical or abnormal lab result, we will notify you by phone as soon as possible.  Submit refill requests through [employer] or call your pharmacy and they will forward the refill request to us. Please allow 3 business days for your refill to be completed.          Additional Information About Your Visit        Applyful Information     [employer]ful lets you send messages to your doctor, view your test results, renew your prescriptions, schedule appointments and more. To sign up, go to [email or web address] . Click on \"Log in\" on the left side of the screen, which will take you to the Welcome page. Then click on \"Sign up Now\" on the right side of the page.     You will be asked to enter the access code listed below, as well as some personal information. Please follow the directions to create your username and password.     Your access code is: [email or web address]  Expires: 2018  2:13 PM     Your access code will  in 90 days. If you need help or a new code, please call your [facility] clinic or [phone].        Care EveryWhere ID     This is your Care EveryWhere ID. This could be used by other organizations to access your [facility] medical records  [id number]         Blood Pressure from Last 3 Encounters:   18 (!) 224/102 "   09/30/16 179/82   07/15/13 (!) 163/93    Weight from Last 3 Encounters:   05/08/18 88 kg (194 lb)   07/15/13 99.8 kg (220 lb)              We Performed the Following     Overnight oximetry study        Primary Care Provider Office Phone # Fax #    Clarisa Cancino [phone]       [facility] [address]   [facility] [address]        Equal Access to Services     ARCHANA POLANCO : Hadii aad ku hadasho Soomaali, waaxda luqadaha, qaybta kaalmada adeegyada, waxay idiin hayaan adeeg kharash lalatasha . So [facility] [phone].    ATENCIÓN: Si habla español, tiene a avelar disposición servicios gratuitos de asistencia lingüística. [facility] [phone].    We comply with applicable federal civil rights laws and [place] laws. We do not discriminate on the basis of race, color, national origin, age, disability, sex, sexual orientation, or gender identity.            Thank you!     Thank you for choosing [facility]  for your care. Our goal is always to provide you with excellent care. Hearing back from our patients is one way we can continue to improve our services. Please take a few minutes to complete the written survey that you may receive in the mail after your visit with us. Thank you!             Your Updated Medication List - Protect others around you: Learn how to safely use, store and throw away your medicines at www.disposemymeds.org.          This list is accurate as of 5/21/18  2:57 PM.  Always use your most recent med list.                   Brand Name Dispense Instructions for use Diagnosis    ALLOPURINOL PO      Take 300 mg by mouth daily        amLODIPine 5 MG tablet    NORVASC    30 tablet    Take 1 tablet (5 mg) by mouth daily    Benign essential hypertension       ASPIRIN ADULT LOW STRENGTH PO      Take 81 mg by mouth daily        ATORVASTATIN CALCIUM PO      Take 40 mg by mouth        LYNDSEY CONTOUR test strip   Generic drug:  blood glucose monitoring      by  In Vitro route daily.        blood glucose monitoring lancets           blood glucose monitoring meter device kit           buPROPion 150 MG 12 hr tablet    ZYBAN     Take 150 mg by mouth 2 times daily        carvedilol 25 MG tablet    COREG    60 tablet    Take 1 tablet (25 mg) by mouth 2 times daily (with meals)    Benign essential hypertension       CLONIDINE HCL PO      Take 0.3 mg by mouth 2 times daily        dapagliflozin tablet    FARXIGA     Take 5 mg by mouth daily        lisinopril 20 MG tablet    PRINIVIL/ZESTRIL     Take 40 mg by mouth daily        order for DME     1 Device    Equipment being ordered: Blood pressure monitor/cuff    Hypertensive urgency       TRILIPIX 135 MG capsule   Generic drug:  fenofibric acid      Take 135 mg by mouth daily        VENTOLIN  (90 Base) MCG/ACT Inhaler   Generic drug:  albuterol      Inhale 2 puffs into the lungs every 6 hours.

## 2018-05-21 NOTE — PROGRESS NOTES
Patient was educated on Overnight oximetry, she will return unit tomorrow and follow up with provider.There is no charge for this.  Rima Cutler

## 2018-05-23 ENCOUNTER — HOSPITAL ENCOUNTER (OUTPATIENT)
Dept: CARDIOLOGY | Facility: CLINIC | Age: 58
Discharge: HOME | End: 2018-05-23
Attending: INTERNAL MEDICINE | Admitting: INTERNAL MEDICINE
Payer: COMMERCIAL

## 2018-05-23 DIAGNOSIS — I10 BENIGN ESSENTIAL HYPERTENSION: ICD-10-CM

## 2018-05-23 PROCEDURE — 93018 CV STRESS TEST I&R ONLY: CPT | Performed by: INTERNAL MEDICINE

## 2018-05-23 PROCEDURE — 93321 DOPPLER ECHO F-UP/LMTD STD: CPT | Mod: 26 | Performed by: INTERNAL MEDICINE

## 2018-05-23 PROCEDURE — 93350 STRESS TTE ONLY: CPT | Mod: 26 | Performed by: INTERNAL MEDICINE

## 2018-05-23 PROCEDURE — 93325 DOPPLER ECHO COLOR FLOW MAPG: CPT | Mod: 26 | Performed by: INTERNAL MEDICINE

## 2018-05-23 PROCEDURE — 93017 CV STRESS TEST TRACING ONLY: CPT | Performed by: REHABILITATION PRACTITIONER

## 2018-05-23 PROCEDURE — 93016 CV STRESS TEST SUPVJ ONLY: CPT | Performed by: INTERNAL MEDICINE

## 2018-05-23 PROCEDURE — 93350 STRESS TTE ONLY: CPT | Mod: TC

## 2018-05-25 ENCOUNTER — TELEPHONE (OUTPATIENT)
Dept: CARDIOLOGY | Facility: CLINIC | Age: 58
End: 2018-05-25

## 2018-05-25 NOTE — TELEPHONE ENCOUNTER
Stress echo  Date: 05-23-18      This was non-diagnostic stress EKG due to the inability to achieve target heart rate. Poor exercise tolerance (04:45 Donn protocol/5.5 Mets). Although no objective signs of ischemia were identified, the patient exhiibited poor exercise tolerance. If there is ongoing concern about myocardial ischemia, a pharmacologic stress test may be a more sensitive imaging modality. Normal resting wall motion and no stress-induced wall motion abnormality. Baseline ejection fraction is estimated at 55-60%.     Left patient a message to return my call. Patient is seeing Dr. Quezada on 06-06-18. ~Abelino HALL  --------------------------------------------------------------------------------------------------------------------  Addendum 05-25-18  (13:46)    Patient returned my call. Patient was advised that Dr. Quezada will review her results with her, at her upcoming appt. Patient had no questions.~Abelino HALL

## 2018-05-28 NOTE — PROGRESS NOTES
EPIC Screening Oximetry Report   URGENT SLEEP STUDY REFERRAL FOR CHF PATIENTS WITH EF 35-50%  FAX WITH TRACINGS TO SLEEP CENTER    This study identifies severity, pattern and frequency of hypoxemia and cannot exclude mild sleep apnea or and may not identified sleep related breathing abnormalities in patients on oxygen or positive airway pressure devices.    Report May 27, 2018  Recording date 5/21/18  Name Lorena Gutierrez  MRN [id number]           REQUIRED FOR URGENT REFERRAL         Meets criteria?  1. ?  Duration of data collection: 8 hours [> 2 hours continuous artifact free]  2. ?  Condition:    RA           [room air]  3. ?  4% O2 desat index:   5.1/ hour  [>15/hour ]  4. ?  Pattern                sustained     [episodic]      Episodic      (Obstructive sleep apnea, Cheyne Low)    Sustained    (Hypoventilation, Lung disease)      SpO2  <88%                          88.4min       [consider supplemental O2]

## 2018-06-01 ENCOUNTER — TELEPHONE (OUTPATIENT)
Dept: CARDIOLOGY | Facility: CLINIC | Age: 58
End: 2018-06-01

## 2018-06-01 DIAGNOSIS — R79.81 ABNORMAL PULSE OXIMETRY: Primary | ICD-10-CM

## 2018-06-01 NOTE — TELEPHONE ENCOUNTER
Dr. Quezada reviewed patient's oximetry report from 05-21-18. Dr. Quezada recommends a sleep evaluation. Sleep consult ordered. Left patient a message to return my call. ~Abelino HALL.  --------------------------------------------------------------------------------------------------------------------  Addendum 06-01-18 (10:21)    Patient returned my call. Recommendations were reviewed with patient. Patient was informed that she will be contacted by the Sleep Center to schedule her appt. Patient was advised to f/u next week as planned. Patient had no questions. Jose HALL

## 2018-06-06 ENCOUNTER — OFFICE VISIT (OUTPATIENT)
Dept: CARDIOLOGY | Facility: CLINIC | Age: 58
End: 2018-06-06
Attending: INTERNAL MEDICINE
Payer: COMMERCIAL

## 2018-06-06 VITALS
OXYGEN SATURATION: 98 % | HEIGHT: 64 IN | BODY MASS INDEX: 33.38 KG/M2 | SYSTOLIC BLOOD PRESSURE: 158 MMHG | HEART RATE: 54 BPM | DIASTOLIC BLOOD PRESSURE: 90 MMHG | WEIGHT: 195.5 LBS

## 2018-06-06 DIAGNOSIS — I10 BENIGN ESSENTIAL HYPERTENSION: ICD-10-CM

## 2018-06-06 LAB
ALT SERPL W P-5'-P-CCNC: 26 U/L (ref 0–50)
ANION GAP SERPL CALCULATED.3IONS-SCNC: 8 MMOL/L (ref 3–14)
BUN SERPL-MCNC: 17 MG/DL (ref 7–30)
CALCIUM SERPL-MCNC: 9.2 MG/DL (ref 8.5–10.1)
CHLORIDE SERPL-SCNC: 106 MMOL/L (ref 94–109)
CHOLEST SERPL-MCNC: 165 MG/DL
CO2 SERPL-SCNC: 28 MMOL/L (ref 20–32)
CREAT SERPL-MCNC: 0.85 MG/DL (ref 0.52–1.04)
GFR SERPL CREATININE-BSD FRML MDRD: 69 ML/MIN/1.7M2
GLUCOSE SERPL-MCNC: 138 MG/DL (ref 70–99)
HDLC SERPL-MCNC: 50 MG/DL
LDLC SERPL CALC-MCNC: 85 MG/DL
NONHDLC SERPL-MCNC: 115 MG/DL
POTASSIUM SERPL-SCNC: 3.8 MMOL/L (ref 3.4–5.3)
SODIUM SERPL-SCNC: 142 MMOL/L (ref 133–144)
TRIGL SERPL-MCNC: 152 MG/DL

## 2018-06-06 PROCEDURE — 36415 COLL VENOUS BLD VENIPUNCTURE: CPT | Performed by: INTERNAL MEDICINE

## 2018-06-06 PROCEDURE — 80061 LIPID PANEL: CPT | Performed by: INTERNAL MEDICINE

## 2018-06-06 PROCEDURE — 84460 ALANINE AMINO (ALT) (SGPT): CPT | Performed by: INTERNAL MEDICINE

## 2018-06-06 PROCEDURE — 80048 BASIC METABOLIC PNL TOTAL CA: CPT | Performed by: INTERNAL MEDICINE

## 2018-06-06 PROCEDURE — 99214 OFFICE O/P EST MOD 30 MIN: CPT | Performed by: INTERNAL MEDICINE

## 2018-06-06 RX ORDER — CHLORTHALIDONE 25 MG/1
12.5 TABLET ORAL DAILY
Qty: 30 TABLET | Refills: 11 | Status: SHIPPED | OUTPATIENT
Start: 2018-06-06

## 2018-06-06 NOTE — PROGRESS NOTES
HISTORY:    Lorena Gutierrez is a pleasant 58-year-old female who is seeing me for refractory hypertension.  She was seen a month ago in cardiology clinic and a number of studies were ordered.  She is here today to review the results of those studies and to readdress her hypertension.    Over the last month Lorena has continued to do well.  She has no new complaints.  She has been checking her blood pressure on a regular basis at various times of day and writing them down for me.  I reviewed that list and there is quite a lot of variability in her blood pressure with systolic pressures occasionally over 200 and occasionally in the 130s but most commonly in the 150s.  There does not appear to be any ocular time of day that her blood pressure is higher.  She brought her blood pressure cuff in with her to her primary care office and found that it is calibrated correctly.    The results of the recent overnight sleep oximetry was reviewed with the patient.  She has a fair number of desaturation episodes and her overall oxygen saturation tends to be on the low side.  She may actually be a candidate for initiation of nighttime oxygen, and I suspect she has significant COPD from her smoking.  A sleep study will be requested since this may be significant attributing factor to her refractory hypertension.    The patient also underwent a stress echo.  She was unable to achieve target heart rate and had a poor exercise capacity, but no inducible ischemia was seen at the level of exercise she was capable of achieving.    A lipid profile was drawn today, and although I do not think she was fasting, the numbers are very acceptable.      ASSESSMENT/PLAN:    1.  Refractory hypertension.  Currently using amlodipine 5 mg, carvedilol 25 mg twice daily, clonidine 0.3 mg twice daily, and lisinopril 40 mg per day.  As far as maximizing her current medications we could go up to amlodipine 10 and clonidine 3 times a day (which is difficult to  take).  Instead, I have elected to initiate a diuretic, chlorthalidone, at a dose of 12.5 mg per day.  We will plan a 2 month follow-up visit.  2.  Possible sleep apnea, full sleep study will be arranged.  This may be a contributing factor to her hypertension.  3.  Cigarette use.  Smoking may also be contributing to her hypertension and I once again encouraged smoking cessation.    Thank you for asked me to participate in your patient's care.  Please do not hesitate to call if I can be of further assistance.  25 minutes face-to-face time today, greater than 50% counseling.    Orders Placed This Encounter   Procedures     Basic metabolic panel     Follow-Up with Cardiologist     Orders Placed This Encounter   Medications     chlorthalidone (HYGROTON) 25 MG tablet     Sig: Take 0.5 tablets (12.5 mg) by mouth daily     Dispense:  30 tablet     Refill:  11     There are no discontinued medications.    10 year ASCVD risk: The 10-year ASCVD risk score (Ontariohussain PAEZ Jr, et al., 2013) is: 18.6%    Values used to calculate the score:      Age: 58 years      Sex: Female      Is Non- : No      Diabetic: Yes      Tobacco smoker: Yes      Systolic Blood Pressure: 158 mmHg      Is BP treated: Yes      HDL Cholesterol: 50 mg/dL      Total Cholesterol: 165 mg/dL    Encounter Diagnosis   Name Primary?     Benign essential hypertension        CURRENT MEDICATIONS:  Current Outpatient Prescriptions   Medication Sig Dispense Refill     albuterol (VENTOLIN HFA) 108 (90 BASE) MCG/ACT inhaler Inhale 2 puffs into the lungs every 6 hours.       ALLOPURINOL PO Take 300 mg by mouth daily       amLODIPine (NORVASC) 5 MG tablet Take 1 tablet (5 mg) by mouth daily 30 tablet 11     ASPIRIN ADULT LOW STRENGTH PO Take 81 mg by mouth daily       ATORVASTATIN CALCIUM PO Take 40 mg by mouth       Blood Glucose Monitoring Suppl (ASCENSIA CONTOUR MONITOR) W/DEVICE KIT        buPROPion (ZYBAN) 150 MG 12 hr tablet Take 150 mg by mouth 2  times daily       carvedilol (COREG) 25 MG tablet Take 1 tablet (25 mg) by mouth 2 times daily (with meals) 60 tablet 11     chlorthalidone (HYGROTON) 25 MG tablet Take 0.5 tablets (12.5 mg) by mouth daily 30 tablet 11     CLONIDINE HCL PO Take 0.3 mg by mouth 2 times daily       dapagliflozin (FARXIGA) tablet Take 5 mg by mouth daily       fenofibric acid (TRILIPIX) 135 MG capsule Take 135 mg by mouth daily       glucose blood VI test strips (LYNDSEY CONTOUR TEST) strip by In Vitro route daily.       Lancets (MICROLET) MISC        lisinopril (PRINIVIL,ZESTRIL) 20 MG tablet Take 40 mg by mouth daily        order for DME Equipment being ordered: Blood pressure monitor/cuff 1 Device 0       ALLERGIES   No Known Allergies    PAST MEDICAL HISTORY:  Past Medical History:   Diagnosis Date     COPD (chronic obstructive pulmonary disease) (H)      Diabetes mellitus (H)      Hypertension        PAST SURGICAL HISTORY:  No past surgical history on file.    FAMILY HISTORY:  No family history on file.    SOCIAL HISTORY:  Social History     Social History     Marital status:      Spouse name: N/A     Number of children: N/A     Years of education: N/A     Social History Main Topics     Smoking status: Current Every Day Smoker     Packs/day: 1.00     Types: Cigarettes     Smokeless tobacco: Not on file     Alcohol use No     Drug use: No     Sexual activity: Not on file     Other Topics Concern     Not on file     Social History Narrative       Review of Systems:  Skin:  Negative     Eyes:  Negative    ENT:  Negative    Respiratory:  Negative    Cardiovascular:  Negative for;palpitations;chest pain;edema lightheadedness;dizziness;Positive for  Gastroenterology: Negative    Genitourinary:  Negative    Musculoskeletal:  Negative    Neurologic:  Negative    Psychiatric:  Negative    Heme/Lymph/Imm:  Negative    Endocrine:  Negative      Physical Exam:  Vitals: /90 (BP Location: Right arm, Patient Position: Fowlers, Cuff  "Size: Adult Large)  Pulse 54  Ht 1.626 m (5' 4\")  Wt 88.7 kg (195 lb 8 oz)  SpO2 98%  BMI 33.56 kg/m2    Constitutional:  cooperative appears older than stated age      Skin:  warm and dry to the touch        Head:  normocephalic        Eyes:  no xanthalasma        ENT:           Neck:           Chest:           Cardiac:                    Abdomen:           Vascular:                                        Extremities and Back:           Neurological:  no gross motor deficits          Recent Lab Results:  LIPID RESULTS:  Lab Results   Component Value Date    CHOL 165 06/06/2018    HDL 50 06/06/2018    LDL 85 06/06/2018    TRIG 152 (H) 06/06/2018       LIVER ENZYME RESULTS:  Lab Results   Component Value Date    AST 20 09/30/2016    ALT 26 06/06/2018       CBC RESULTS:  Lab Results   Component Value Date    WBC 10.1 09/30/2016    RBC 5.54 (H) 09/30/2016    HGB 16.1 (H) 09/30/2016    HCT 47.7 (H) 09/30/2016    MCV 86 09/30/2016    MCH 29.1 09/30/2016    MCHC 33.8 09/30/2016    RDW 14.4 09/30/2016     09/30/2016       BMP RESULTS:  Lab Results   Component Value Date     06/06/2018    POTASSIUM 3.8 06/06/2018    CHLORIDE 106 06/06/2018    CO2 28 06/06/2018    ANIONGAP 8 06/06/2018     (H) 06/06/2018    BUN 17 06/06/2018    CR 0.85 06/06/2018    GFRESTIMATED 69 06/06/2018    GFRESTBLACK 83 06/06/2018    CLAUDIO 9.2 06/06/2018        A1C RESULTS:  No results found for: A1C    INR RESULTS:  No results found for: INR      Phuc Quezada MD, FACC    CC  Phuc Quezada MD  [address] [place] [id number]                  "

## 2018-06-06 NOTE — LETTER
6/6/2018    Clarisa Cancino NP  [facility] [address]  [place] [address]    RE: Lorena Gutierrez       Dear Colleague,    I had the pleasure of seeing Lorena Gutierrez in the [facility].    HISTORY:    Lorena Gutierrez is a pleasant 58-year-old female who is seeing me for refractory hypertension.  She was seen a month ago in cardiology clinic and a number of studies were ordered.  She is here today to review the results of those studies and to readdress her hypertension.    Over the last month Lorena has continued to do well.  She has no new complaints.  She has been checking her blood pressure on a regular basis at various times of day and writing them down for me.  I reviewed that list and there is quite a lot of variability in her blood pressure with systolic pressures occasionally over 200 and occasionally in the 130s but most commonly in the 150s.  There does not appear to be any ocular time of day that her blood pressure is higher.  She brought her blood pressure cuff in with her to her primary care office and found that it is calibrated correctly.    The results of the recent overnight sleep oximetry was reviewed with the patient.  She has a fair number of desaturation episodes and her overall oxygen saturation tends to be on the low side.  She may actually be a candidate for initiation of nighttime oxygen, and I suspect she has significant COPD from her smoking.  A sleep study will be requested since this may be significant attributing factor to her refractory hypertension.    The patient also underwent a stress echo.  She was unable to achieve target heart rate and had a poor exercise capacity, but no inducible ischemia was seen at the level of exercise she was capable of achieving.    A lipid profile was drawn today, and although I do not think she was fasting, the numbers are very acceptable.      ASSESSMENT/PLAN:    1.  Refractory hypertension.  Currently  using amlodipine 5 mg, carvedilol 25 mg twice daily, clonidine 0.3 mg twice daily, and lisinopril 40 mg per day.  As far as maximizing her current medications we could go up to amlodipine 10 and clonidine 3 times a day (which is difficult to take).  Instead, I have elected to initiate a diuretic, chlorthalidone, at a dose of 12.5 mg per day.  We will plan a 2 month follow-up visit.  2.  Possible sleep apnea, full sleep study will be arranged.  This may be a contributing factor to her hypertension.  3.  Cigarette use.  Smoking may also be contributing to her hypertension and I once again encouraged smoking cessation.    Thank you for asked me to participate in your patient's care.  Please do not hesitate to call if I can be of further assistance.  25 minutes face-to-face time today, greater than 50% counseling.    Orders Placed This Encounter   Procedures     Basic metabolic panel     Follow-Up with Cardiologist     Orders Placed This Encounter   Medications     chlorthalidone (HYGROTON) 25 MG tablet     Sig: Take 0.5 tablets (12.5 mg) by mouth daily     Dispense:  30 tablet     Refill:  11     There are no discontinued medications.    10 year ASCVD risk: The 10-year ASCVD risk score (Dany DC Jr, et al., 2013) is: 18.6%    Values used to calculate the score:      Age: 58 years      Sex: Female      Is Non- : No      Diabetic: Yes      Tobacco smoker: Yes      Systolic Blood Pressure: 158 mmHg      Is BP treated: Yes      HDL Cholesterol: 50 mg/dL      Total Cholesterol: 165 mg/dL    Encounter Diagnosis   Name Primary?     Benign essential hypertension        CURRENT MEDICATIONS:  Current Outpatient Prescriptions   Medication Sig Dispense Refill     albuterol (VENTOLIN HFA) 108 (90 BASE) MCG/ACT inhaler Inhale 2 puffs into the lungs every 6 hours.       ALLOPURINOL PO Take 300 mg by mouth daily       amLODIPine (NORVASC) 5 MG tablet Take 1 tablet (5 mg) by mouth daily 30 tablet 11     ASPIRIN  ADULT LOW STRENGTH PO Take 81 mg by mouth daily       ATORVASTATIN CALCIUM PO Take 40 mg by mouth       Blood Glucose Monitoring Suppl ([employer] CONTOUR MONITOR) W/DEVICE KIT        buPROPion (ZYBAN) 150 MG 12 hr tablet Take 150 mg by mouth 2 times daily       carvedilol (COREG) 25 MG tablet Take 1 tablet (25 mg) by mouth 2 times daily (with meals) 60 tablet 11     chlorthalidone (HYGROTON) 25 MG tablet Take 0.5 tablets (12.5 mg) by mouth daily 30 tablet 11     CLONIDINE HCL PO Take 0.3 mg by mouth 2 times daily       dapagliflozin (FARXIGA) tablet Take 5 mg by mouth daily       fenofibric acid (TRILIPIX) 135 MG capsule Take 135 mg by mouth daily       glucose blood VI test strips (LYNDSEY CONTOUR TEST) strip by In Vitro route daily.       Lancets (MICROLET) MISC        lisinopril (PRINIVIL,ZESTRIL) 20 MG tablet Take 40 mg by mouth daily        order for DME Equipment being ordered: Blood pressure monitor/cuff 1 Device 0       ALLERGIES   No Known Allergies    PAST MEDICAL HISTORY:  Past Medical History:   Diagnosis Date     COPD (chronic obstructive pulmonary disease) (H)      Diabetes mellitus (H)      Hypertension        PAST SURGICAL HISTORY:  No past surgical history on file.    FAMILY HISTORY:  No family history on file.    SOCIAL HISTORY:  Social History     Social History     Marital status:      Spouse name: N/A     Number of children: N/A     Years of education: N/A     Social History Main Topics     Smoking status: Current Every Day Smoker     Packs/day: 1.00     Types: Cigarettes     Smokeless tobacco: Not on file     Alcohol use No     Drug use: No     Sexual activity: Not on file     Other Topics Concern     Not on file     Social History Narrative       Review of Systems:  Skin:  Negative     Eyes:  Negative    ENT:  Negative    Respiratory:  Negative    Cardiovascular:  Negative for;palpitations;chest pain;edema lightheadedness;dizziness;Positive for  Gastroenterology: Negative    Genitourinary:  " Negative    Musculoskeletal:  Negative    Neurologic:  Negative    Psychiatric:  Negative    Heme/Lymph/Imm:  Negative    Endocrine:  Negative      Physical Exam:  Vitals: /90 (BP Location: Right arm, Patient Position: Fowlers, Cuff Size: Adult Large)  Pulse 54  Ht 1.626 m (5' 4\")  Wt 88.7 kg (195 lb 8 oz)  SpO2 98%  BMI 33.56 kg/m2    Constitutional:  cooperative appears older than stated age      Skin:  warm and dry to the touch        Head:  normocephalic        Eyes:  no xanthalasma        ENT:           Neck:           Chest:           Cardiac:                    Abdomen:           Vascular:                                        Extremities and Back:           Neurological:  no gross motor deficits          Recent Lab Results:  LIPID RESULTS:  Lab Results   Component Value Date    CHOL 165 06/06/2018    HDL 50 06/06/2018    LDL 85 06/06/2018    TRIG 152 (H) 06/06/2018       LIVER ENZYME RESULTS:  Lab Results   Component Value Date    AST 20 09/30/2016    ALT 26 06/06/2018       CBC RESULTS:  Lab Results   Component Value Date    WBC 10.1 09/30/2016    RBC 5.54 (H) 09/30/2016    HGB 16.1 (H) 09/30/2016    HCT 47.7 (H) 09/30/2016    MCV 86 09/30/2016    MCH 29.1 09/30/2016    MCHC 33.8 09/30/2016    RDW 14.4 09/30/2016     09/30/2016       BMP RESULTS:  Lab Results   Component Value Date     06/06/2018    POTASSIUM 3.8 06/06/2018    CHLORIDE 106 06/06/2018    CO2 28 06/06/2018    ANIONGAP 8 06/06/2018     (H) 06/06/2018    BUN 17 06/06/2018    CR 0.85 06/06/2018    GFRESTIMATED 69 06/06/2018    GFRESTBLACK 83 06/06/2018    CLAUDIO 9.2 06/06/2018        A1C RESULTS:  No results found for: A1C    INR RESULTS:  No results found for: INR      Thank you for allowing me to participate in the care of your patient.    Sincerely,     Phuc Quezada MD     [facility]    "

## 2018-06-06 NOTE — MR AVS SNAPSHOT
"              After Visit Summary   6/6/2018    Lorena Gutierrez    MRN: [id number]           Patient Information     Date Of Birth          1960        Visit Information        Provider Department      6/6/2018 11:00 AM Phuc Quezada MD [facility]        Today's Diagnoses     Benign essential hypertension           Follow-ups after your visit        Additional Services     Follow-Up with Cardiologist                 Future tests that were ordered for you today     Open Future Orders        Priority Expected Expires Ordered    Follow-Up with Cardiologist Routine 8/6/2018 6/6/2019 6/6/2018    Basic metabolic panel Routine 6/13/2018 6/6/2019 6/6/2018            Who to contact     If you have questions or need follow up information about today's clinic visit or your schedule please contact [facility] directly at [phone].  Normal or non-critical lab and imaging results will be communicated to you by MyChart, letter or phone within 4 business days after the clinic has received the results. If you do not hear from us within 7 days, please contact the clinic through treadalonghart or phone. If you have a critical or abnormal lab result, we will notify you by phone as soon as possible.  Submit refill requests through [employer] or call your pharmacy and they will forward the refill request to us. Please allow 3 business days for your refill to be completed.          Additional Information About Your Visit        MyChart Information     [employer] lets you send messages to your doctor, view your test results, renew your prescriptions, schedule appointments and more. To sign up, go to [email or web address] . Click on \"Log in\" on the left side of the screen, which will take you to the Welcome page. Then click on \"Sign up Now\" on the right side of the page.     You will be asked to enter the access code listed below, as well as some " "personal information. Please follow the directions to create your username and password.     Your access code is: [email or web address]  Expires: 2018  2:13 PM     Your access code will  in 90 days. If you need help or a new code, please call your [place] clinic or [phone].        Care EveryWhere ID     This is your Care EveryWhere ID. This could be used by other organizations to access your [place] medical records  [id number]        Your Vitals Were     Pulse Height Pulse Oximetry BMI (Body Mass Index)          54 1.626 m (5' 4\") 98% 33.56 kg/m2         Blood Pressure from Last 3 Encounters:   18 158/90   18 (!) 224/102   16 179/82    Weight from Last 3 Encounters:   18 88.7 kg (195 lb 8 oz)   18 88 kg (194 lb)   07/15/13 99.8 kg (220 lb)              We Performed the Following     ALT     Basic metabolic panel     Follow-Up with Cardiologist     Lipid Profile          Today's Medication Changes          These changes are accurate as of 18 11:34 AM.  If you have any questions, ask your nurse or doctor.               Start taking these medicines.        Dose/Directions    chlorthalidone 25 MG tablet   Commonly known as:  HYGROTON   Used for:  Benign essential hypertension        Dose:  12.5 mg   Take 0.5 tablets (12.5 mg) by mouth daily   Quantity:  30 tablet   Refills:  11            Where to get your medicines      These medications were sent to [facility] - [address]  [address] [facility] [address], [place] [address]     Phone:  [phone]     chlorthalidone 25 MG tablet                Primary Care Provider Office Phone # Fax #    Clarisa Cancino [phone]       [facility] [address]   [facility] [place] [address]        Equal Access to Services     [facility] Center[facility] AH: Marysol dougherty Sojen, waaxda luqadaha, qaybta kaalhermes hatch. [facility] [phone].    ATENCIÓN: Si daniella warren, " tiene a avelar disposición servicios gratuitos de asistencia lingüística. Isaiah siddiqi [phone].    We comply with applicable federal civil rights laws and [place] laws. We do not discriminate on the basis of race, color, national origin, age, disability, sex, sexual orientation, or gender identity.            Thank you!     Thank you for choosing [facility]  for your care. Our goal is always to provide you with excellent care. Hearing back from our patients is one way we can continue to improve our services. Please take a few minutes to complete the written survey that you may receive in the mail after your visit with us. Thank you!             Your Updated Medication List - Protect others around you: Learn how to safely use, store and throw away your medicines at www.disposemymeds.org.          This list is accurate as of 6/6/18 11:34 AM.  Always use your most recent med list.                   Brand Name Dispense Instructions for use Diagnosis    ALLOPURINOL PO      Take 300 mg by mouth daily        amLODIPine 5 MG tablet    NORVASC    30 tablet    Take 1 tablet (5 mg) by mouth daily    Benign essential hypertension       ASPIRIN ADULT LOW STRENGTH PO      Take 81 mg by mouth daily        ATORVASTATIN CALCIUM PO      Take 40 mg by mouth        LYNDSEY CONTOUR test strip   Generic drug:  blood glucose monitoring      by In Vitro route daily.        blood glucose monitoring lancets           blood glucose monitoring meter device kit           buPROPion 150 MG 12 hr tablet    ZYBAN     Take 150 mg by mouth 2 times daily        carvedilol 25 MG tablet    COREG    60 tablet    Take 1 tablet (25 mg) by mouth 2 times daily (with meals)    Benign essential hypertension       chlorthalidone 25 MG tablet    HYGROTON    30 tablet    Take 0.5 tablets (12.5 mg) by mouth daily    Benign essential hypertension       CLONIDINE HCL PO      Take 0.3 mg by mouth 2 times daily         dapagliflozin tablet    FARXIGA     Take 5 mg by mouth daily        lisinopril 20 MG tablet    PRINIVIL/ZESTRIL     Take 40 mg by mouth daily        order for DME     1 Device    Equipment being ordered: Blood pressure monitor/cuff    Hypertensive urgency       TRILIPIX 135 MG capsule   Generic drug:  fenofibric acid      Take 135 mg by mouth daily        VENTOLIN  (90 Base) MCG/ACT Inhaler   Generic drug:  albuterol      Inhale 2 puffs into the lungs every 6 hours.

## 2018-06-13 DIAGNOSIS — I10 BENIGN ESSENTIAL HYPERTENSION: ICD-10-CM

## 2018-06-13 LAB
ANION GAP SERPL CALCULATED.3IONS-SCNC: 8 MMOL/L (ref 3–14)
BUN SERPL-MCNC: 24 MG/DL (ref 7–30)
CALCIUM SERPL-MCNC: 9.5 MG/DL (ref 8.5–10.1)
CHLORIDE SERPL-SCNC: 105 MMOL/L (ref 94–109)
CO2 SERPL-SCNC: 29 MMOL/L (ref 20–32)
CREAT SERPL-MCNC: 0.86 MG/DL (ref 0.52–1.04)
GFR SERPL CREATININE-BSD FRML MDRD: 68 ML/MIN/1.7M2
GLUCOSE SERPL-MCNC: 102 MG/DL (ref 70–99)
POTASSIUM SERPL-SCNC: 3.7 MMOL/L (ref 3.4–5.3)
SODIUM SERPL-SCNC: 142 MMOL/L (ref 133–144)

## 2018-06-13 PROCEDURE — 36415 COLL VENOUS BLD VENIPUNCTURE: CPT | Performed by: INTERNAL MEDICINE

## 2018-06-13 PROCEDURE — 80048 BASIC METABOLIC PNL TOTAL CA: CPT | Performed by: INTERNAL MEDICINE

## 2018-10-15 ENCOUNTER — OFFICE VISIT (OUTPATIENT)
Dept: CARDIOLOGY | Facility: CLINIC | Age: 58
End: 2018-10-15
Attending: INTERNAL MEDICINE
Payer: COMMERCIAL

## 2018-10-15 VITALS
DIASTOLIC BLOOD PRESSURE: 76 MMHG | HEART RATE: 54 BPM | WEIGHT: 218.4 LBS | SYSTOLIC BLOOD PRESSURE: 120 MMHG | OXYGEN SATURATION: 97 % | HEIGHT: 64 IN | BODY MASS INDEX: 37.28 KG/M2

## 2018-10-15 DIAGNOSIS — I10 BENIGN ESSENTIAL HYPERTENSION: ICD-10-CM

## 2018-10-15 DIAGNOSIS — R79.81 ABNORMAL PULSE OXIMETRY: Primary | ICD-10-CM

## 2018-10-15 PROCEDURE — 99214 OFFICE O/P EST MOD 30 MIN: CPT | Performed by: INTERNAL MEDICINE

## 2018-10-15 RX ORDER — CARVEDILOL 25 MG/1
12.5 TABLET ORAL 2 TIMES DAILY WITH MEALS
Qty: 60 TABLET | Refills: 11 | COMMUNITY
Start: 2018-10-15 | End: 2019-05-13

## 2018-10-15 NOTE — MR AVS SNAPSHOT
"              After Visit Summary   10/15/2018    Lorena Gutierrez    MRN: [id number]           Patient Information     Date Of Birth          1960        Visit Information        Provider Department      10/15/2018 1:00 PM Phuc Quezada MD [facility]        Today's Diagnoses     Benign essential hypertension           Follow-ups after your visit        Additional Services     Follow-Up with Cardiologist                 Future tests that were ordered for you today     Open Future Orders        Priority Expected Expires Ordered    Follow-Up with Cardiologist Routine 12/14/2018 10/15/2019 10/15/2018            Who to contact     If you have questions or need follow up information about today's clinic visit or your schedule please contact [facility] directly at [phone].  Normal or non-critical lab and imaging results will be communicated to you by MyChart, letter or phone within 4 business days after the clinic has received the results. If you do not hear from us within 7 days, please contact the clinic through MyChart or phone. If you have a critical or abnormal lab result, we will notify you by phone as soon as possible.  Submit refill requests through [employer] or call your pharmacy and they will forward the refill request to us. Please allow 3 business days for your refill to be completed.          Additional Information About Your Visit        MyChart Information     [employer] lets you send messages to your doctor, view your test results, renew your prescriptions, schedule appointments and more. To sign up, go to [email or web address] . Click on \"Log in\" on the left side of the screen, which will take you to the Welcome page. Then click on \"Sign up Now\" on the right side of the page.     You will be asked to enter the access code listed below, as well as some personal information. Please follow the directions " "to create your username and password.     Your access code is: [email or web address]  Expires: 2019  1:15 PM     Your access code will  in 90 days. If you need help or a new code, please call your [place] clinic or [phone].        Care EveryWhere ID     This is your Care EveryWhere ID. This could be used by other organizations to access your [place] medical records  [id number]        Your Vitals Were     Pulse Height Pulse Oximetry BMI (Body Mass Index)          54 1.626 m (5' 4\") 97% 37.49 kg/m2         Blood Pressure from Last 3 Encounters:   10/15/18 120/76   18 158/90   18 (!) 224/102    Weight from Last 3 Encounters:   10/15/18 99.1 kg (218 lb 6.4 oz)   18 88.7 kg (195 lb 8 oz)   18 88 kg (194 lb)              We Performed the Following     Follow-Up with Cardiologist          Today's Medication Changes          These changes are accurate as of 10/15/18  1:15 PM.  If you have any questions, ask your nurse or doctor.               These medicines have changed or have updated prescriptions.        Dose/Directions    carvedilol 25 MG tablet   Commonly known as:  COREG   This may have changed:  how much to take   Used for:  Benign essential hypertension        Dose:  12.5 mg   Take 0.5 tablets (12.5 mg) by mouth 2 times daily (with meals)   Quantity:  60 tablet   Refills:  11                Primary Care Provider Office Phone # Fax #    Clarisa Cancino [phone]       [place] [address]   [facility] [address]        Equal Access to Services     [facility] AH: Hadii haresh Mendez, walolada luqadaha, qaybta hermes chen. So [facility] [phone].    ATENCIÓN: Si habla español, tiene a avelar disposición servicios gratuitos de asistencia lingüística. Llame al [phone].    We comply with applicable federal civil rights laws and [place] laws. We do not discriminate on the basis of race, color, " national origin, age, disability, sex, sexual orientation, or gender identity.            Thank you!     Thank you for choosing [facility]  for your care. Our goal is always to provide you with excellent care. Hearing back from our patients is one way we can continue to improve our services. Please take a few minutes to complete the written survey that you may receive in the mail after your visit with us. Thank you!             Your Updated Medication List - Protect others around you: Learn how to safely use, store and throw away your medicines at www.disposemymeds.org.          This list is accurate as of 10/15/18  1:15 PM.  Always use your most recent med list.                   Brand Name Dispense Instructions for use Diagnosis    ALLOPURINOL PO      Take 300 mg by mouth daily        amLODIPine 5 MG tablet    NORVASC    30 tablet    Take 1 tablet (5 mg) by mouth daily    Benign essential hypertension       ASPIRIN ADULT LOW STRENGTH PO      Take 81 mg by mouth daily        ATORVASTATIN CALCIUM PO      Take 40 mg by mouth        LYNDSEY CONTOUR test strip   Generic drug:  blood glucose monitoring      by In Vitro route daily.        blood glucose monitoring lancets           blood glucose monitoring meter device kit           carvedilol 25 MG tablet    COREG    60 tablet    Take 0.5 tablets (12.5 mg) by mouth 2 times daily (with meals)    Benign essential hypertension       chlorthalidone 25 MG tablet    HYGROTON    30 tablet    Take 0.5 tablets (12.5 mg) by mouth daily    Benign essential hypertension       CLONIDINE HCL PO      Take 0.3 mg by mouth 2 times daily        dapagliflozin tablet    FARXIGA     Take 5 mg by mouth daily        ESCITALOPRAM OXALATE PO      Take 20 mg by mouth daily        lisinopril 20 MG tablet    PRINIVIL/ZESTRIL     Take 40 mg by mouth daily        order for DME     1 Device    Equipment being ordered: Blood pressure monitor/cuff     Hypertensive urgency       TRILIPIX 135 MG capsule   Generic drug:  fenofibric acid      Take 135 mg by mouth daily        VENTOLIN  (90 Base) MCG/ACT inhaler   Generic drug:  albuterol      Inhale 2 puffs into the lungs every 6 hours.

## 2018-10-15 NOTE — LETTER
"10/15/2018    Clarisa Cancino NP  [facility]   [address]  [facility] [address]    RE: Lorena Gutierrez       Dear Colleague,    I had the pleasure of seeing Lorena Gutierrez in the [facility].    HISTORY:    Lorena Gutierrez is a pleasant 58-year-old female being followed for refractory hypertension. She checks her blood pressure at home on a regular basis but forgot to bring in her values today. In the past I have reviewed those list with her and found that she has fairly labile blood pressure with systolic values as high as 200 and occasionally as well as 130s but mostly in the 150s. She reports today that most of her blood pressure values are in the 120s but occasionally they are as low as 95/60. Other medical problems including history of diabetes, hyperlipidemia, and a history of smoking.    Lorena describes a vague sense of something not being right. She describes this as a dizziness but says that it really doesn't make her feeling the room is spinning or that she is about to fall generally. She reported this seemed to start about a year ago and initially had was on and off but more recently and is been more constant. She thinks this is been present for 2 months and didn't notice her real change when we started her chlorthalidone at the time of her last visit. She has a difficult time describing the symptoms stating that it \"feels like I'm not here\" and that it \"feels like I'm walking in a fog\". She states that she is afraid to drive with this but has never had actual syncope. She feels sometimes that she has to grab onto something because she is about to fall. Unfortunately, she has never checked her blood pressure or heart rate while she had her more profound symptoms. In addition to these symptoms, she states that she has been feeling very sleepy over the past few months.    Since our last visit Lorena has given up smoking. She states she is having somewhat of a " hard time with it but has successfully avoided cigarettes for a little over a month now. I congratulated her and encouraged her to continue. She reports no other changes in symptoms since her last visit.      ASSESSMENT/PLAN:    1.   Refractory hypertension. Currently using amlodipine 5 mg daily, carvedilol 25 mg twice daily, clonidine 0.3 mg twice daily and lisinopril 40 mg per day along with chlorthalidone 5 mg per day. She describes something like a dizziness but with much more vague descriptions. She states that this is more or less constant, and her blood pressure at home is typically in the 120s. I do note that her heart rate tends to be in the 50s because of her carvedilol. On occasions her symptoms are much worse and she has never checked her blood pressure with these episodes. I encouraged her to do so. In the meantime, I will cut back on her dose of carvedilol to 12.5 mg b.i.d. I really don't think these symptoms are secondary to low blood pressure but it could be due to low pulse. If blood pressure rises with this change in medication we will either go back to the previous dose of carvedilol (if symptoms didn't change), or considering going up on either the dose of chlorthalidone or amlodipine.  2. Cigarette use. Patient has given up cigarettes for a month now and I congratulated her for this success.  3. Possible sleep apnea. Patient has not yet undergone a sleep study. This will be addressed at her next visit.    Thank you for inviting me to participate in your patient's care. Please don't hesitate to call if I can be of further assistance.  Orders Placed This Encounter   Procedures     Follow-Up with Cardiologist     Orders Placed This Encounter   Medications     ESCITALOPRAM OXALATE PO     Sig: Take 20 mg by mouth daily     carvedilol (COREG) 25 MG tablet     Sig: Take 0.5 tablets (12.5 mg) by mouth 2 times daily (with meals)     Dispense:  60 tablet     Refill:  11     Medications Discontinued During  This Encounter   Medication Reason     buPROPion (ZYBAN) 150 MG 12 hr tablet Therapy completed     carvedilol (COREG) 25 MG tablet Reorder       10 year ASCVD risk: The 10-year ASCVD risk score (Dany PAEZ Jr, et al., 2013) is: 11.2%    Values used to calculate the score:      Age: 58 years      Sex: Female      Is Non- : No      Diabetic: Yes      Tobacco smoker: Yes      Systolic Blood Pressure: 120 mmHg      Is BP treated: Yes      HDL Cholesterol: 50 mg/dL      Total Cholesterol: 165 mg/dL    Encounter Diagnosis   Name Primary?     Benign essential hypertension        CURRENT MEDICATIONS:  Current Outpatient Prescriptions   Medication Sig Dispense Refill     albuterol (VENTOLIN HFA) 108 (90 BASE) MCG/ACT inhaler Inhale 2 puffs into the lungs every 6 hours.       ALLOPURINOL PO Take 300 mg by mouth daily       amLODIPine (NORVASC) 5 MG tablet Take 1 tablet (5 mg) by mouth daily 30 tablet 11     ASPIRIN ADULT LOW STRENGTH PO Take 81 mg by mouth daily       ATORVASTATIN CALCIUM PO Take 40 mg by mouth       carvedilol (COREG) 25 MG tablet Take 0.5 tablets (12.5 mg) by mouth 2 times daily (with meals) 60 tablet 11     chlorthalidone (HYGROTON) 25 MG tablet Take 0.5 tablets (12.5 mg) by mouth daily 30 tablet 11     CLONIDINE HCL PO Take 0.3 mg by mouth 2 times daily       dapagliflozin (FARXIGA) tablet Take 5 mg by mouth daily       ESCITALOPRAM OXALATE PO Take 20 mg by mouth daily       fenofibric acid (TRILIPIX) 135 MG capsule Take 135 mg by mouth daily       lisinopril (PRINIVIL,ZESTRIL) 20 MG tablet Take 40 mg by mouth daily        Blood Glucose Monitoring Suppl ([employer] CONTOUR MONITOR) W/DEVICE KIT        glucose blood VI test strips (LYNDSEY CONTOUR TEST) strip by In Vitro route daily.       Lancets (MICROLET) MISC        order for DME Equipment being ordered: Blood pressure monitor/cuff 1 Device 0     [DISCONTINUED] carvedilol (COREG) 25 MG tablet Take 1 tablet (25 mg) by mouth 2 times  "daily (with meals) 60 tablet 11       ALLERGIES   No Known Allergies    PAST MEDICAL HISTORY:  Past Medical History:   Diagnosis Date     COPD (chronic obstructive pulmonary disease) (H)      Diabetes mellitus (H)      Hypertension        PAST SURGICAL HISTORY:  No past surgical history on file.    FAMILY HISTORY:  No family history on file.    SOCIAL HISTORY:  Social History     Social History     Marital status:      Spouse name: N/A     Number of children: N/A     Years of education: N/A     Social History Main Topics     Smoking status: Current Every Day Smoker     Packs/day: 1.00     Types: Cigarettes     Smokeless tobacco: Not on file     Alcohol use No     Drug use: No     Sexual activity: Not on file     Other Topics Concern     Not on file     Social History Narrative       Review of Systems:  Skin:  Negative     Eyes:  Negative    ENT:  Negative    Respiratory:  Negative    Cardiovascular:  Negative for;palpitations;chest pain;edema lightheadedness;dizziness;Positive for  Gastroenterology: Negative    Genitourinary:  Negative    Musculoskeletal:  Negative    Neurologic:  Negative    Psychiatric:  Negative    Heme/Lymph/Imm:  Negative    Endocrine:  Negative      Physical Exam:  Vitals: /76 (BP Location: Right arm, Patient Position: Fowlers, Cuff Size: Adult Large)  Pulse 54  Ht 1.626 m (5' 4\")  Wt 99.1 kg (218 lb 6.4 oz)  SpO2 97%  BMI 37.49 kg/m2    Constitutional:  cooperative appears older than stated age      Skin:  warm and dry to the touch        Head:  normocephalic        Eyes:  no xanthalasma        ENT:  no pallor or cyanosis        Neck:  carotid pulses are full and equal bilaterally, JVP normal, no carotid bruit        Chest:      Diffuse rhonchi, no wheezes, breath sounds mildly diminished throughout.    Cardiac: regular rhythm, normal S1/S2, no S3 or S4, apical impulse not displaced, no murmurs, gallops or rubs                  Abdomen:  abdomen soft;BS normoactive    "     Vascular: pulses full and equal                                      Extremities and Back:  no edema        Neurological:  no gross motor deficits          Recent Lab Results:  LIPID RESULTS:  Lab Results   Component Value Date    CHOL 165 06/06/2018    HDL 50 06/06/2018    LDL 85 06/06/2018    TRIG 152 (H) 06/06/2018       LIVER ENZYME RESULTS:  Lab Results   Component Value Date    AST 20 09/30/2016    ALT 26 06/06/2018       CBC RESULTS:  Lab Results   Component Value Date    WBC 10.1 09/30/2016    RBC 5.54 (H) 09/30/2016    HGB 16.1 (H) 09/30/2016    HCT 47.7 (H) 09/30/2016    MCV 86 09/30/2016    MCH 29.1 09/30/2016    MCHC 33.8 09/30/2016    RDW 14.4 09/30/2016     09/30/2016       BMP RESULTS:  Lab Results   Component Value Date     06/13/2018    POTASSIUM 3.7 06/13/2018    CHLORIDE 105 06/13/2018    CO2 29 06/13/2018    ANIONGAP 8 06/13/2018     (H) 06/13/2018    BUN 24 06/13/2018    CR 0.86 06/13/2018    GFRESTIMATED 68 06/13/2018    GFRESTBLACK 83 06/13/2018    CLAUDIO 9.5 06/13/2018        A1C RESULTS:  No results found for: A1C    INR RESULTS:  No results found for: INR      Phuc Quezada MD, [facility]    CC  Phuc Quezada MD  [address] [place] [id number]                    Thank you for allowing me to participate in the care of your patient.      Sincerely,     Phuc Quezada MD     [facility]

## 2018-10-15 NOTE — LETTER
"10/15/2018    Clarisa Cancino NP  [facility] [address]  [facility] [address]    RE: Lorena Gutierrez       Dear Colleague,    I had the pleasure of seeing Lorena Gutierrez in the [facility].    HISTORY:    Lorena Gutierrez is a pleasant 58-year-old female being followed for refractory hypertension. She checks her blood pressure at home on a regular basis but forgot to bring in her values today. In the past I have reviewed those list with her and found that she has fairly labile blood pressure with systolic values as high as 200 and occasionally as well as 130s but mostly in the 150s. She reports today that most of her blood pressure values are in the 120s but occasionally they are as low as 95/60. Other medical problems including history of diabetes, hyperlipidemia, and a history of smoking.    Lorena describes a vague sense of something not being right. She describes this as a dizziness but says that it really doesn't make her feeling the room is spinning or that she is about to fall generally. She reported this seemed to start about a year ago and initially had was on and off but more recently and is been more constant. She thinks this is been present for 2 months and didn't notice her real change when we started her chlorthalidone at the time of her last visit. She has a difficult time describing the symptoms stating that it \"feels like I'm not here\" and that it \"feels like I'm walking in a fog\". She states that she is afraid to drive with this but has never had actual syncope. She feels sometimes that she has to grab onto something because she is about to fall. Unfortunately, she has never checked her blood pressure or heart rate while she had her more profound symptoms. In addition to these symptoms, she states that she has been feeling very sleepy over the past few months.    Since our last visit Lorena has given up smoking. She states she is having somewhat of a " hard time with it but has successfully avoided cigarettes for a little over a month now. I congratulated her and encouraged her to continue. She reports no other changes in symptoms since her last visit.      ASSESSMENT/PLAN:    1.   Refractory hypertension. Currently using amlodipine 5 mg daily, carvedilol 25 mg twice daily, clonidine 0.3 mg twice daily and lisinopril 40 mg per day along with chlorthalidone 5 mg per day. She describes something like a dizziness but with much more vague descriptions. She states that this is more or less constant, and her blood pressure at home is typically in the 120s. I do note that her heart rate tends to be in the 50s because of her carvedilol. On occasions her symptoms are much worse and she has never checked her blood pressure with these episodes. I encouraged her to do so. In the meantime, I will cut back on her dose of carvedilol to 12.5 mg b.i.d. I really don't think these symptoms are secondary to low blood pressure but it could be due to low pulse. If blood pressure rises with this change in medication we will either go back to the previous dose of carvedilol (if symptoms didn't change), or considering going up on either the dose of chlorthalidone or amlodipine.  2. Cigarette use. Patient has given up cigarettes for a month now and I congratulated her for this success.  3. Possible sleep apnea. Patient has not yet undergone a sleep study. This will be addressed at her next visit.    Thank you for inviting me to participate in your patient's care. Please don't hesitate to call if I can be of further assistance.  Orders Placed This Encounter   Procedures     Follow-Up with Cardiologist     Orders Placed This Encounter   Medications     ESCITALOPRAM OXALATE PO     Sig: Take 20 mg by mouth daily     carvedilol (COREG) 25 MG tablet     Sig: Take 0.5 tablets (12.5 mg) by mouth 2 times daily (with meals)     Dispense:  60 tablet     Refill:  11     Medications Discontinued During  This Encounter   Medication Reason     buPROPion (ZYBAN) 150 MG 12 hr tablet Therapy completed     carvedilol (COREG) 25 MG tablet Reorder       10 year ASCVD risk: The 10-year ASCVD risk score (Dany PAEZ Jr, et al., 2013) is: 11.2%    Values used to calculate the score:      Age: 58 years      Sex: Female      Is Non- : No      Diabetic: Yes      Tobacco smoker: Yes      Systolic Blood Pressure: 120 mmHg      Is BP treated: Yes      HDL Cholesterol: 50 mg/dL      Total Cholesterol: 165 mg/dL    Encounter Diagnosis   Name Primary?     Benign essential hypertension        CURRENT MEDICATIONS:  Current Outpatient Prescriptions   Medication Sig Dispense Refill     albuterol (VENTOLIN HFA) 108 (90 BASE) MCG/ACT inhaler Inhale 2 puffs into the lungs every 6 hours.       ALLOPURINOL PO Take 300 mg by mouth daily       amLODIPine (NORVASC) 5 MG tablet Take 1 tablet (5 mg) by mouth daily 30 tablet 11     ASPIRIN ADULT LOW STRENGTH PO Take 81 mg by mouth daily       ATORVASTATIN CALCIUM PO Take 40 mg by mouth       carvedilol (COREG) 25 MG tablet Take 0.5 tablets (12.5 mg) by mouth 2 times daily (with meals) 60 tablet 11     chlorthalidone (HYGROTON) 25 MG tablet Take 0.5 tablets (12.5 mg) by mouth daily 30 tablet 11     CLONIDINE HCL PO Take 0.3 mg by mouth 2 times daily       dapagliflozin (FARXIGA) tablet Take 5 mg by mouth daily       ESCITALOPRAM OXALATE PO Take 20 mg by mouth daily       fenofibric acid (TRILIPIX) 135 MG capsule Take 135 mg by mouth daily       lisinopril (PRINIVIL,ZESTRIL) 20 MG tablet Take 40 mg by mouth daily        Blood Glucose Monitoring Suppl ([employer] CONTOUR MONITOR) W/DEVICE KIT        glucose blood VI test strips (LYNDSEY CONTOUR TEST) strip by In Vitro route daily.       Lancets (MICROLET) MISC        order for DME Equipment being ordered: Blood pressure monitor/cuff 1 Device 0     [DISCONTINUED] carvedilol (COREG) 25 MG tablet Take 1 tablet (25 mg) by mouth 2 times  "daily (with meals) 60 tablet 11       ALLERGIES   No Known Allergies    PAST MEDICAL HISTORY:  Past Medical History:   Diagnosis Date     COPD (chronic obstructive pulmonary disease) (H)      Diabetes mellitus (H)      Hypertension        PAST SURGICAL HISTORY:  No past surgical history on file.    FAMILY HISTORY:  No family history on file.    SOCIAL HISTORY:  Social History     Social History     Marital status:      Spouse name: N/A     Number of children: N/A     Years of education: N/A     Social History Main Topics     Smoking status: Current Every Day Smoker     Packs/day: 1.00     Types: Cigarettes     Smokeless tobacco: Not on file     Alcohol use No     Drug use: No     Sexual activity: Not on file     Other Topics Concern     Not on file     Social History Narrative       Review of Systems:  Skin:  Negative     Eyes:  Negative    ENT:  Negative    Respiratory:  Negative    Cardiovascular:  Negative for;palpitations;chest pain;edema lightheadedness;dizziness;Positive for  Gastroenterology: Negative    Genitourinary:  Negative    Musculoskeletal:  Negative    Neurologic:  Negative    Psychiatric:  Negative    Heme/Lymph/Imm:  Negative    Endocrine:  Negative      Physical Exam:  Vitals: /76 (BP Location: Right arm, Patient Position: Fowlers, Cuff Size: Adult Large)  Pulse 54  Ht 1.626 m (5' 4\")  Wt 99.1 kg (218 lb 6.4 oz)  SpO2 97%  BMI 37.49 kg/m2    Constitutional:  cooperative appears older than stated age      Skin:  warm and dry to the touch        Head:  normocephalic        Eyes:  no xanthalasma        ENT:  no pallor or cyanosis        Neck:  carotid pulses are full and equal bilaterally, JVP normal, no carotid bruit        Chest:      Diffuse rhonchi, no wheezes, breath sounds mildly diminished throughout.    Cardiac: regular rhythm, normal S1/S2, no S3 or S4, apical impulse not displaced, no murmurs, gallops or rubs                  Abdomen:  abdomen soft;BS normoactive    "     Vascular: pulses full and equal                                      Extremities and Back:  no edema        Neurological:  no gross motor deficits          Recent Lab Results:  LIPID RESULTS:  Lab Results   Component Value Date    CHOL 165 06/06/2018    HDL 50 06/06/2018    LDL 85 06/06/2018    TRIG 152 (H) 06/06/2018       LIVER ENZYME RESULTS:  Lab Results   Component Value Date    AST 20 09/30/2016    ALT 26 06/06/2018       CBC RESULTS:  Lab Results   Component Value Date    WBC 10.1 09/30/2016    RBC 5.54 (H) 09/30/2016    HGB 16.1 (H) 09/30/2016    HCT 47.7 (H) 09/30/2016    MCV 86 09/30/2016    MCH 29.1 09/30/2016    MCHC 33.8 09/30/2016    RDW 14.4 09/30/2016     09/30/2016       BMP RESULTS:  Lab Results   Component Value Date     06/13/2018    POTASSIUM 3.7 06/13/2018    CHLORIDE 105 06/13/2018    CO2 29 06/13/2018    ANIONGAP 8 06/13/2018     (H) 06/13/2018    BUN 24 06/13/2018    CR 0.86 06/13/2018    GFRESTIMATED 68 06/13/2018    GFRESTBLACK 83 06/13/2018    CLAUDIO 9.5 06/13/2018        A1C RESULTS:  No results found for: A1C    INR RESULTS:  No results found for: INR      Phuc Quezada MD, FACC    CC  Phuc Quezada MD  [address] [place] [id number]                    Thank you for allowing me to participate in the care of your patient.      Sincerely,     Phuc Quezada MD     [facility]    cc:   Phuc Quezada MD  [address] [place] [id number]

## 2018-10-15 NOTE — PROGRESS NOTES
"HISTORY:    Lorena Gutierrez is a pleasant 58-year-old female being followed for refractory hypertension. She checks her blood pressure at home on a regular basis but forgot to bring in her values today. In the past I have reviewed those list with her and found that she has fairly labile blood pressure with systolic values as high as 200 and occasionally as well as 130s but mostly in the 150s. She reports today that most of her blood pressure values are in the 120s but occasionally they are as low as 95/60. Other medical problems including history of diabetes, hyperlipidemia, and a history of smoking.    Lorena describes a vague sense of something not being right. She describes this as a dizziness but says that it really doesn't make her feeling the room is spinning or that she is about to fall generally. She reported this seemed to start about a year ago and initially had was on and off but more recently and is been more constant. She thinks this is been present for 2 months and didn't notice her real change when we started her chlorthalidone at the time of her last visit. She has a difficult time describing the symptoms stating that it \"feels like I'm not here\" and that it \"feels like I'm walking in a fog\". She states that she is afraid to drive with this but has never had actual syncope. She feels sometimes that she has to grab onto something because she is about to fall. Unfortunately, she has never checked her blood pressure or heart rate while she had her more profound symptoms. In addition to these symptoms, she states that she has been feeling very sleepy over the past few months.    Since our last visit Lorena has given up smoking. She states she is having somewhat of a hard time with it but has successfully avoided cigarettes for a little over a month now. I congratulated her and encouraged her to continue. She reports no other changes in symptoms since her last visit.      ASSESSMENT/PLAN:    1.   Refractory " hypertension. Currently using amlodipine 5 mg daily, carvedilol 25 mg twice daily, clonidine 0.3 mg twice daily and lisinopril 40 mg per day along with chlorthalidone 5 mg per day. She describes something like a dizziness but with much more vague descriptions. She states that this is more or less constant, and her blood pressure at home is typically in the 120s. I do note that her heart rate tends to be in the 50s because of her carvedilol. On occasions her symptoms are much worse and she has never checked her blood pressure with these episodes. I encouraged her to do so. In the meantime, I will cut back on her dose of carvedilol to 12.5 mg b.i.d. I really don't think these symptoms are secondary to low blood pressure but it could be due to low pulse. If blood pressure rises with this change in medication we will either go back to the previous dose of carvedilol (if symptoms didn't change), or considering going up on either the dose of chlorthalidone or amlodipine.  2. Cigarette use. Patient has given up cigarettes for a month now and I congratulated her for this success.  3. Possible sleep apnea. Patient has not yet undergone a sleep study. This will be addressed at her next visit.    Thank you for inviting me to participate in your patient's care. Please don't hesitate to call if I can be of further assistance.  Orders Placed This Encounter   Procedures     Follow-Up with Cardiologist     Orders Placed This Encounter   Medications     ESCITALOPRAM OXALATE PO     Sig: Take 20 mg by mouth daily     carvedilol (COREG) 25 MG tablet     Sig: Take 0.5 tablets (12.5 mg) by mouth 2 times daily (with meals)     Dispense:  60 tablet     Refill:  11     Medications Discontinued During This Encounter   Medication Reason     buPROPion (ZYBAN) 150 MG 12 hr tablet Therapy completed     carvedilol (COREG) 25 MG tablet Reorder       10 year ASCVD risk: The 10-year ASCVD risk score (Dany JEANNINE Jr, et al., 2013) is: 11.2%    Values  used to calculate the score:      Age: 58 years      Sex: Female      Is Non- : No      Diabetic: Yes      Tobacco smoker: Yes      Systolic Blood Pressure: 120 mmHg      Is BP treated: Yes      HDL Cholesterol: 50 mg/dL      Total Cholesterol: 165 mg/dL    Encounter Diagnosis   Name Primary?     Benign essential hypertension        CURRENT MEDICATIONS:  Current Outpatient Prescriptions   Medication Sig Dispense Refill     albuterol (VENTOLIN HFA) 108 (90 BASE) MCG/ACT inhaler Inhale 2 puffs into the lungs every 6 hours.       ALLOPURINOL PO Take 300 mg by mouth daily       amLODIPine (NORVASC) 5 MG tablet Take 1 tablet (5 mg) by mouth daily 30 tablet 11     ASPIRIN ADULT LOW STRENGTH PO Take 81 mg by mouth daily       ATORVASTATIN CALCIUM PO Take 40 mg by mouth       carvedilol (COREG) 25 MG tablet Take 0.5 tablets (12.5 mg) by mouth 2 times daily (with meals) 60 tablet 11     chlorthalidone (HYGROTON) 25 MG tablet Take 0.5 tablets (12.5 mg) by mouth daily 30 tablet 11     CLONIDINE HCL PO Take 0.3 mg by mouth 2 times daily       dapagliflozin (FARXIGA) tablet Take 5 mg by mouth daily       ESCITALOPRAM OXALATE PO Take 20 mg by mouth daily       fenofibric acid (TRILIPIX) 135 MG capsule Take 135 mg by mouth daily       lisinopril (PRINIVIL,ZESTRIL) 20 MG tablet Take 40 mg by mouth daily        Blood Glucose Monitoring Suppl ([employer] CONTOUR MONITOR) W/DEVICE KIT        glucose blood VI test strips (LYNDSEY CONTOUR TEST) strip by In Vitro route daily.       Lancets (MICROLET) MISC        order for DME Equipment being ordered: Blood pressure monitor/cuff 1 Device 0     [DISCONTINUED] carvedilol (COREG) 25 MG tablet Take 1 tablet (25 mg) by mouth 2 times daily (with meals) 60 tablet 11       ALLERGIES   No Known Allergies    PAST MEDICAL HISTORY:  Past Medical History:   Diagnosis Date     COPD (chronic obstructive pulmonary disease) (H)      Diabetes mellitus (H)      Hypertension        PAST  "SURGICAL HISTORY:  No past surgical history on file.    FAMILY HISTORY:  No family history on file.    SOCIAL HISTORY:  Social History     Social History     Marital status:      Spouse name: N/A     Number of children: N/A     Years of education: N/A     Social History Main Topics     Smoking status: Current Every Day Smoker     Packs/day: 1.00     Types: Cigarettes     Smokeless tobacco: Not on file     Alcohol use No     Drug use: No     Sexual activity: Not on file     Other Topics Concern     Not on file     Social History Narrative       Review of Systems:  Skin:  Negative     Eyes:  Negative    ENT:  Negative    Respiratory:  Negative    Cardiovascular:  Negative for;palpitations;chest pain;edema lightheadedness;dizziness;Positive for  Gastroenterology: Negative    Genitourinary:  Negative    Musculoskeletal:  Negative    Neurologic:  Negative    Psychiatric:  Negative    Heme/Lymph/Imm:  Negative    Endocrine:  Negative      Physical Exam:  Vitals: /76 (BP Location: Right arm, Patient Position: Fowlers, Cuff Size: Adult Large)  Pulse 54  Ht 1.626 m (5' 4\")  Wt 99.1 kg (218 lb 6.4 oz)  SpO2 97%  BMI 37.49 kg/m2    Constitutional:  cooperative appears older than stated age      Skin:  warm and dry to the touch        Head:  normocephalic        Eyes:  no xanthalasma        ENT:  no pallor or cyanosis        Neck:  carotid pulses are full and equal bilaterally, JVP normal, no carotid bruit        Chest:      Diffuse rhonchi, no wheezes, breath sounds mildly diminished throughout.    Cardiac: regular rhythm, normal S1/S2, no S3 or S4, apical impulse not displaced, no murmurs, gallops or rubs                  Abdomen:  abdomen soft;BS normoactive        Vascular: pulses full and equal                                      Extremities and Back:  no edema        Neurological:  no gross motor deficits          Recent Lab Results:  LIPID RESULTS:  Lab Results   Component Value Date    CHOL 165 " 06/06/2018    HDL 50 06/06/2018    LDL 85 06/06/2018    TRIG 152 (H) 06/06/2018       LIVER ENZYME RESULTS:  Lab Results   Component Value Date    AST 20 09/30/2016    ALT 26 06/06/2018       CBC RESULTS:  Lab Results   Component Value Date    WBC 10.1 09/30/2016    RBC 5.54 (H) 09/30/2016    HGB 16.1 (H) 09/30/2016    HCT 47.7 (H) 09/30/2016    MCV 86 09/30/2016    MCH 29.1 09/30/2016    MCHC 33.8 09/30/2016    RDW 14.4 09/30/2016     09/30/2016       BMP RESULTS:  Lab Results   Component Value Date     06/13/2018    POTASSIUM 3.7 06/13/2018    CHLORIDE 105 06/13/2018    CO2 29 06/13/2018    ANIONGAP 8 06/13/2018     (H) 06/13/2018    BUN 24 06/13/2018    CR 0.86 06/13/2018    GFRESTIMATED 68 06/13/2018    GFRESTBLACK 83 06/13/2018    CLAUDIO 9.5 06/13/2018        A1C RESULTS:  No results found for: A1C    INR RESULTS:  No results found for: INR      Phuc Quezada MD, FACC    CC  Phuc Quezada MD  [address] [place] [id number]

## 2019-02-15 ENCOUNTER — TELEPHONE (OUTPATIENT)
Dept: CARDIOLOGY | Facility: CLINIC | Age: 59
End: 2019-02-15

## 2019-02-15 NOTE — TELEPHONE ENCOUNTER
Due for OV with Dr hernandez and carla mcintyre, mailed letter to pt as well. Thank You Jennifer

## 2019-02-15 NOTE — LETTER
[facility] AT [address] [address]  Phone: [phone]  Fax: [phone]           MARY JO GUTIERREZ  [address] [address] [address]                2/15/2019      Dear Mary Jo Gutierrez,    Our records indicate that you are due for the following cardiology appointment(s):    _XXX_call [phone]___  Office Visit  _____  Echo   _____  Holter Monitor _____  Stress Echo   _____  Pacemaker Check _____  Nuclear Stress    _____  Fasting Blood Work  _xxx____  Other sleep eval call [phone]   _____  INR        If you are due for a test, you may find the information about that test and the appropriate preparation for it enclosed. For the test results to be as accurate as possible, all steps described for preparation must be followed. We will assist you in answering any questions you may have concerning your testing.     Please call Specialty Scheduling at [phone] to schedule this appointment. Office hours are Monday through Friday 7:30 am to 5:00 pm.    Thank you,    Specialty Scheduling Staff

## 2019-05-13 DIAGNOSIS — I10 BENIGN ESSENTIAL HYPERTENSION: ICD-10-CM

## 2019-05-13 RX ORDER — CARVEDILOL 25 MG/1
12.5 TABLET ORAL 2 TIMES DAILY WITH MEALS
Qty: 60 TABLET | Refills: 0 | Status: SHIPPED | OUTPATIENT
Start: 2019-05-13 | End: 2019-07-10

## 2019-07-10 DIAGNOSIS — I10 BENIGN ESSENTIAL HYPERTENSION: ICD-10-CM

## 2019-07-10 RX ORDER — CARVEDILOL 25 MG/1
12.5 TABLET ORAL 2 TIMES DAILY WITH MEALS
Qty: 60 TABLET | Refills: 0 | Status: SHIPPED | OUTPATIENT
Start: 2019-07-10 | End: 2019-08-05

## 2019-08-05 DIAGNOSIS — I10 BENIGN ESSENTIAL HYPERTENSION: ICD-10-CM

## 2019-08-05 RX ORDER — CARVEDILOL 25 MG/1
12.5 TABLET ORAL 2 TIMES DAILY WITH MEALS
Qty: 60 TABLET | Refills: 0 | Status: SHIPPED | OUTPATIENT
Start: 2019-08-05

## 2019-08-05 NOTE — TELEPHONE ENCOUNTER
Message sent to specialty schedule to call patient for office visit for further refills on medications.

## 2019-11-26 ENCOUNTER — TELEPHONE (OUTPATIENT)
Dept: FAMILY MEDICINE | Facility: CLINIC | Age: 59
End: 2019-11-26